# Patient Record
Sex: MALE | Race: BLACK OR AFRICAN AMERICAN | Employment: UNEMPLOYED | URBAN - METROPOLITAN AREA
[De-identification: names, ages, dates, MRNs, and addresses within clinical notes are randomized per-mention and may not be internally consistent; named-entity substitution may affect disease eponyms.]

---

## 2022-04-11 ENCOUNTER — APPOINTMENT (OUTPATIENT)
Dept: NON INVASIVE DIAGNOSTICS | Age: 52
DRG: 917 | End: 2022-04-11
Attending: FAMILY MEDICINE

## 2022-04-11 ENCOUNTER — APPOINTMENT (OUTPATIENT)
Dept: GENERAL RADIOLOGY | Age: 52
DRG: 917 | End: 2022-04-11
Attending: STUDENT IN AN ORGANIZED HEALTH CARE EDUCATION/TRAINING PROGRAM

## 2022-04-11 ENCOUNTER — APPOINTMENT (OUTPATIENT)
Dept: CT IMAGING | Age: 52
DRG: 917 | End: 2022-04-11
Attending: STUDENT IN AN ORGANIZED HEALTH CARE EDUCATION/TRAINING PROGRAM

## 2022-04-11 ENCOUNTER — HOSPITAL ENCOUNTER (INPATIENT)
Age: 52
LOS: 4 days | Discharge: HOME | DRG: 917 | End: 2022-04-15
Attending: STUDENT IN AN ORGANIZED HEALTH CARE EDUCATION/TRAINING PROGRAM | Admitting: FAMILY MEDICINE

## 2022-04-11 DIAGNOSIS — T50.901A ACCIDENTAL OVERDOSE, INITIAL ENCOUNTER: Primary | ICD-10-CM

## 2022-04-11 DIAGNOSIS — F19.10 POLYSUBSTANCE ABUSE (HCC): ICD-10-CM

## 2022-04-11 DIAGNOSIS — J96.02 ACUTE RESPIRATORY FAILURE WITH HYPERCAPNIA (HCC): ICD-10-CM

## 2022-04-11 DIAGNOSIS — R41.89 UNRESPONSIVE: ICD-10-CM

## 2022-04-11 PROBLEM — I21.4 NSTEMI (NON-ST ELEVATED MYOCARDIAL INFARCTION) (HCC): Status: ACTIVE | Noted: 2022-04-11

## 2022-04-11 PROBLEM — R06.89 HYPERCAPNIA: Status: ACTIVE | Noted: 2022-04-11

## 2022-04-11 PROBLEM — J96.01 ACUTE RESPIRATORY FAILURE WITH HYPOXEMIA (HCC): Status: ACTIVE | Noted: 2022-04-11

## 2022-04-11 PROBLEM — F14.90 COCAINE USE: Status: ACTIVE | Noted: 2022-04-11

## 2022-04-11 LAB
AMPHET UR QL SCN: NEGATIVE
ANION GAP SERPL CALC-SCNC: 7 MMOL/L (ref 3–18)
APPEARANCE UR: ABNORMAL
ARTERIAL PATENCY WRIST A: POSITIVE
ARTERIAL PATENCY WRIST A: POSITIVE
BACTERIA URNS QL MICRO: ABNORMAL /HPF
BARBITURATES UR QL SCN: NEGATIVE
BASE DEFICIT BLD-SCNC: 2.9 MMOL/L
BASE DEFICIT BLD-SCNC: 3 MMOL/L
BASE DEFICIT BLD-SCNC: 5.3 MMOL/L
BASE EXCESS BLD CALC-SCNC: 2.3 MMOL/L
BASOPHILS # BLD: 0.1 K/UL (ref 0–0.1)
BASOPHILS NFR BLD: 0 % (ref 0–2)
BDY SITE: ABNORMAL
BENZODIAZ UR QL: NEGATIVE
BILIRUB UR QL: NEGATIVE
BODY TEMPERATURE: 97
BUN SERPL-MCNC: 11 MG/DL (ref 7–18)
BUN/CREAT SERPL: 8 (ref 12–20)
CALCIUM SERPL-MCNC: 9.3 MG/DL (ref 8.5–10.1)
CANNABINOIDS UR QL SCN: POSITIVE
CHLORIDE SERPL-SCNC: 104 MMOL/L (ref 100–111)
CK SERPL-CCNC: 224 U/L (ref 39–308)
CO2 SERPL-SCNC: 28 MMOL/L (ref 21–32)
COCAINE UR QL SCN: POSITIVE
COLOR UR: YELLOW
CREAT SERPL-MCNC: 1.37 MG/DL (ref 0.6–1.3)
DIFFERENTIAL METHOD BLD: ABNORMAL
ECHO AO ROOT DIAM: 2.9 CM
ECHO AO ROOT INDEX: 1.54 CM/M2
ECHO AV AREA PEAK VELOCITY: 2.8 CM2
ECHO AV AREA VTI: 2.9 CM2
ECHO AV AREA/BSA PEAK VELOCITY: 1.5 CM2/M2
ECHO AV AREA/BSA VTI: 1.5 CM2/M2
ECHO AV MEAN GRADIENT: 1 MMHG
ECHO AV MEAN VELOCITY: 0.5 M/S
ECHO AV PEAK GRADIENT: 2 MMHG
ECHO AV PEAK VELOCITY: 0.7 M/S
ECHO AV VELOCITY RATIO: 0.86
ECHO AV VTI: 11.7 CM
ECHO LA VOL 2C: 49 ML (ref 18–58)
ECHO LA VOL 4C: 33 ML (ref 18–58)
ECHO LA VOL BP: 40 ML (ref 18–58)
ECHO LA VOL/BSA BIPLANE: 21 ML/M2 (ref 16–34)
ECHO LA VOLUME AREA LENGTH: 41 ML
ECHO LA VOLUME INDEX A2C: 26 ML/M2 (ref 16–34)
ECHO LA VOLUME INDEX A4C: 18 ML/M2 (ref 16–34)
ECHO LA VOLUME INDEX AREA LENGTH: 22 ML/M2 (ref 16–34)
ECHO LV E' LATERAL VELOCITY: 9 CM/S
ECHO LV E' SEPTAL VELOCITY: 6 CM/S
ECHO LV EDV A2C: 94 ML
ECHO LV EDV A4C: 83 ML
ECHO LV EDV BP: 94 ML (ref 67–155)
ECHO LV EDV INDEX A4C: 44 ML/M2
ECHO LV EDV INDEX BP: 50 ML/M2
ECHO LV EDV NDEX A2C: 50 ML/M2
ECHO LV EJECTION FRACTION A2C: 46 %
ECHO LV EJECTION FRACTION A4C: 42 %
ECHO LV EJECTION FRACTION BIPLANE: 45 % (ref 55–100)
ECHO LV ESV A2C: 51 ML
ECHO LV ESV A4C: 48 ML
ECHO LV ESV BP: 52 ML (ref 22–58)
ECHO LV ESV INDEX A2C: 27 ML/M2
ECHO LV ESV INDEX A4C: 26 ML/M2
ECHO LV ESV INDEX BP: 28 ML/M2
ECHO LV FRACTIONAL SHORTENING: 15 % (ref 28–44)
ECHO LV INTERNAL DIMENSION DIASTOLE INDEX: 1.76 CM/M2
ECHO LV INTERNAL DIMENSION DIASTOLIC: 3.3 CM (ref 4.2–5.9)
ECHO LV INTERNAL DIMENSION SYSTOLIC INDEX: 1.49 CM/M2
ECHO LV INTERNAL DIMENSION SYSTOLIC: 2.8 CM
ECHO LV IVSD: 1.2 CM (ref 0.6–1)
ECHO LV MASS 2D: 109.1 G (ref 88–224)
ECHO LV MASS INDEX 2D: 58 G/M2 (ref 49–115)
ECHO LV POSTERIOR WALL DIASTOLIC: 1 CM (ref 0.6–1)
ECHO LV RELATIVE WALL THICKNESS RATIO: 0.61
ECHO LVOT AREA: 3.1 CM2
ECHO LVOT AV VTI INDEX: 0.97
ECHO LVOT DIAM: 2 CM
ECHO LVOT MEAN GRADIENT: 1 MMHG
ECHO LVOT PEAK GRADIENT: 2 MMHG
ECHO LVOT PEAK VELOCITY: 0.6 M/S
ECHO LVOT STROKE VOLUME INDEX: 18.9 ML/M2
ECHO LVOT SV: 35.5 ML
ECHO LVOT VTI: 11.3 CM
ECHO MV A VELOCITY: 0.35 M/S
ECHO MV E DECELERATION TIME (DT): 312.8 MS
ECHO MV E VELOCITY: 0.36 M/S
ECHO MV E/A RATIO: 1.03
ECHO MV E/E' LATERAL: 4
ECHO MV E/E' RATIO (AVERAGED): 5
ECHO MV E/E' SEPTAL: 6
ECHO MV REGURGITANT PEAK GRADIENT: 0 MMHG
ECHO MV REGURGITANT PEAK VELOCITY: 0.1 M/S
ECHO RV TAPSE: 1.2 CM (ref 1.7–?)
EOSINOPHIL # BLD: 0 K/UL (ref 0–0.4)
EOSINOPHIL NFR BLD: 0 % (ref 0–5)
EPITH CASTS URNS QL MICRO: ABNORMAL /LPF (ref 0–5)
ERYTHROCYTE [DISTWIDTH] IN BLOOD BY AUTOMATED COUNT: 13.1 % (ref 11.6–14.5)
ETHANOL SERPL-MCNC: 15 MG/DL (ref 0–3)
GAS FLOW.O2 O2 DELIVERY SYS: ABNORMAL L/MIN
GAS FLOW.O2 SETTING OXYMISER: 20 BPM
GAS FLOW.O2 SETTING OXYMISER: 20 BPM
GLUCOSE BLD STRIP.AUTO-MCNC: 102 MG/DL (ref 70–110)
GLUCOSE BLD STRIP.AUTO-MCNC: 106 MG/DL (ref 70–110)
GLUCOSE BLD STRIP.AUTO-MCNC: 89 MG/DL (ref 70–110)
GLUCOSE SERPL-MCNC: 80 MG/DL (ref 74–99)
GLUCOSE UR STRIP.AUTO-MCNC: NEGATIVE MG/DL
GRAN CASTS URNS QL MICRO: ABNORMAL /LPF
HCO3 BLD-SCNC: 23.6 MMOL/L (ref 22–26)
HCO3 BLD-SCNC: 24.4 MMOL/L (ref 22–26)
HCO3 BLD-SCNC: 28.1 MMOL/L (ref 22–26)
HCO3 BLD-SCNC: 28.5 MMOL/L (ref 22–26)
HCT VFR BLD AUTO: 45.5 % (ref 36–48)
HDSCOM,HDSCOM: ABNORMAL
HGB BLD-MCNC: 14.4 G/DL (ref 13–16)
HGB UR QL STRIP: ABNORMAL
HYALINE CASTS URNS QL MICRO: ABNORMAL /LPF (ref 0–2)
IMM GRANULOCYTES # BLD AUTO: 0.7 K/UL (ref 0–0.04)
IMM GRANULOCYTES NFR BLD AUTO: 3 % (ref 0–0.5)
KETONES UR QL STRIP.AUTO: NEGATIVE MG/DL
LEUKOCYTE ESTERASE UR QL STRIP.AUTO: NEGATIVE
LYMPHOCYTES # BLD: 1.6 K/UL (ref 0.9–3.6)
LYMPHOCYTES NFR BLD: 6 % (ref 21–52)
MCH RBC QN AUTO: 29.7 PG (ref 24–34)
MCHC RBC AUTO-ENTMCNC: 31.6 G/DL (ref 31–37)
MCV RBC AUTO: 93.8 FL (ref 78–100)
METHADONE UR QL: NEGATIVE
MONOCYTES # BLD: 2.1 K/UL (ref 0.05–1.2)
MONOCYTES NFR BLD: 8 % (ref 3–10)
NEUTS SEG # BLD: 21.4 K/UL (ref 1.8–8)
NEUTS SEG NFR BLD: 83 % (ref 40–73)
NITRITE UR QL STRIP.AUTO: NEGATIVE
NRBC # BLD: 0 K/UL (ref 0–0.01)
NRBC BLD-RTO: 0 PER 100 WBC
O2/TOTAL GAS SETTING VFR VENT: 100 %
O2/TOTAL GAS SETTING VFR VENT: 40 %
O2/TOTAL GAS SETTING VFR VENT: 50 %
O2/TOTAL GAS SETTING VFR VENT: 50 %
OPIATES UR QL: NEGATIVE
OTHER,OTHU: ABNORMAL
PCO2 BLD: 49 MMHG (ref 35–45)
PCO2 BLD: 51.9 MMHG (ref 35–45)
PCO2 BLD: 57.5 MMHG (ref 35–45)
PCO2 BLD: 73.4 MMHG (ref 35–45)
PCP UR QL: NEGATIVE
PEEP RESPIRATORY: 5 CMH2O
PEEP RESPIRATORY: 5 CMH2O
PH BLD: 7.19 [PH] (ref 7.35–7.45)
PH BLD: 7.22 [PH] (ref 7.35–7.45)
PH BLD: 7.28 [PH] (ref 7.35–7.45)
PH BLD: 7.37 [PH] (ref 7.35–7.45)
PH UR STRIP: 5 [PH] (ref 5–8)
PLATELET # BLD AUTO: 446 K/UL (ref 135–420)
PMV BLD AUTO: 9.2 FL (ref 9.2–11.8)
PO2 BLD: 187 MMHG (ref 80–100)
PO2 BLD: 207 MMHG (ref 80–100)
PO2 BLD: 38 MMHG (ref 80–100)
PO2 BLD: 66 MMHG (ref 80–100)
POTASSIUM SERPL-SCNC: 5.1 MMOL/L (ref 3.5–5.5)
PROT UR STRIP-MCNC: 100 MG/DL
RBC # BLD AUTO: 4.85 M/UL (ref 4.35–5.65)
RBC #/AREA URNS HPF: ABNORMAL /HPF (ref 0–5)
SAO2 % BLD: 64 % (ref 92–97)
SAO2 % BLD: 87.9 % (ref 92–97)
SAO2 % BLD: 99.5 % (ref 92–97)
SAO2 % BLD: 99.6 % (ref 92–97)
SERVICE CMNT-IMP: ABNORMAL
SODIUM SERPL-SCNC: 139 MMOL/L (ref 136–145)
SP GR UR REFRACTOMETRY: 1.01 (ref 1–1.03)
SPECIMEN TYPE: ABNORMAL
TROPONIN-HIGH SENSITIVITY: 114 NG/L (ref 0–78)
TROPONIN-HIGH SENSITIVITY: 115 NG/L (ref 0–78)
TROPONIN-HIGH SENSITIVITY: 169 NG/L (ref 0–78)
TROPONIN-HIGH SENSITIVITY: 59 NG/L (ref 0–78)
UROBILINOGEN UR QL STRIP.AUTO: 1 EU/DL (ref 0.2–1)
VENTILATION MODE VENT: ABNORMAL
VENTILATION MODE VENT: ABNORMAL
VT SETTING VENT: 450 ML
VT SETTING VENT: 450 ML
WBC # BLD AUTO: 25.9 K/UL (ref 4.6–13.2)
WBC URNS QL MICRO: ABNORMAL /HPF (ref 0–5)

## 2022-04-11 PROCEDURE — 82962 GLUCOSE BLOOD TEST: CPT

## 2022-04-11 PROCEDURE — 77010033678 HC OXYGEN DAILY

## 2022-04-11 PROCEDURE — 82550 ASSAY OF CK (CPK): CPT

## 2022-04-11 PROCEDURE — 74011000258 HC RX REV CODE- 258: Performed by: INTERNAL MEDICINE

## 2022-04-11 PROCEDURE — 74011000250 HC RX REV CODE- 250: Performed by: INTERNAL MEDICINE

## 2022-04-11 PROCEDURE — 82077 ASSAY SPEC XCP UR&BREATH IA: CPT

## 2022-04-11 PROCEDURE — 96376 TX/PRO/DX INJ SAME DRUG ADON: CPT

## 2022-04-11 PROCEDURE — 99285 EMERGENCY DEPT VISIT HI MDM: CPT

## 2022-04-11 PROCEDURE — 85025 COMPLETE CBC W/AUTO DIFF WBC: CPT

## 2022-04-11 PROCEDURE — 36415 COLL VENOUS BLD VENIPUNCTURE: CPT

## 2022-04-11 PROCEDURE — 65610000006 HC RM INTENSIVE CARE

## 2022-04-11 PROCEDURE — 70450 CT HEAD/BRAIN W/O DYE: CPT

## 2022-04-11 PROCEDURE — 74011250636 HC RX REV CODE- 250/636: Performed by: STUDENT IN AN ORGANIZED HEALTH CARE EDUCATION/TRAINING PROGRAM

## 2022-04-11 PROCEDURE — 71045 X-RAY EXAM CHEST 1 VIEW: CPT

## 2022-04-11 PROCEDURE — 74011000250 HC RX REV CODE- 250: Performed by: STUDENT IN AN ORGANIZED HEALTH CARE EDUCATION/TRAINING PROGRAM

## 2022-04-11 PROCEDURE — 36600 WITHDRAWAL OF ARTERIAL BLOOD: CPT

## 2022-04-11 PROCEDURE — 84484 ASSAY OF TROPONIN QUANT: CPT

## 2022-04-11 PROCEDURE — 82803 BLOOD GASES ANY COMBINATION: CPT

## 2022-04-11 PROCEDURE — 74011000250 HC RX REV CODE- 250: Performed by: FAMILY MEDICINE

## 2022-04-11 PROCEDURE — 74011250636 HC RX REV CODE- 250/636: Performed by: FAMILY MEDICINE

## 2022-04-11 PROCEDURE — 5A1945Z RESPIRATORY VENTILATION, 24-96 CONSECUTIVE HOURS: ICD-10-PCS | Performed by: FAMILY MEDICINE

## 2022-04-11 PROCEDURE — 74011000258 HC RX REV CODE- 258: Performed by: STUDENT IN AN ORGANIZED HEALTH CARE EDUCATION/TRAINING PROGRAM

## 2022-04-11 PROCEDURE — 96374 THER/PROPH/DIAG INJ IV PUSH: CPT

## 2022-04-11 PROCEDURE — 80048 BASIC METABOLIC PNL TOTAL CA: CPT

## 2022-04-11 PROCEDURE — 80307 DRUG TEST PRSMV CHEM ANLYZR: CPT

## 2022-04-11 PROCEDURE — C9113 INJ PANTOPRAZOLE SODIUM, VIA: HCPCS | Performed by: FAMILY MEDICINE

## 2022-04-11 PROCEDURE — 74011250636 HC RX REV CODE- 250/636: Performed by: INTERNAL MEDICINE

## 2022-04-11 PROCEDURE — 93306 TTE W/DOPPLER COMPLETE: CPT

## 2022-04-11 PROCEDURE — 93005 ELECTROCARDIOGRAM TRACING: CPT

## 2022-04-11 PROCEDURE — 94002 VENT MGMT INPAT INIT DAY: CPT

## 2022-04-11 PROCEDURE — 81001 URINALYSIS AUTO W/SCOPE: CPT

## 2022-04-11 RX ORDER — NALOXONE HYDROCHLORIDE 1 MG/ML
1 INJECTION INTRAMUSCULAR; INTRAVENOUS; SUBCUTANEOUS ONCE
Status: COMPLETED | OUTPATIENT
Start: 2022-04-11 | End: 2022-04-11

## 2022-04-11 RX ORDER — NALOXONE HYDROCHLORIDE 1 MG/ML
INJECTION INTRAMUSCULAR; INTRAVENOUS; SUBCUTANEOUS
Status: DISPENSED
Start: 2022-04-11 | End: 2022-04-11

## 2022-04-11 RX ORDER — ONDANSETRON 4 MG/1
4 TABLET, ORALLY DISINTEGRATING ORAL
Status: DISCONTINUED | OUTPATIENT
Start: 2022-04-11 | End: 2022-04-11

## 2022-04-11 RX ORDER — SODIUM CHLORIDE, SODIUM LACTATE, POTASSIUM CHLORIDE, CALCIUM CHLORIDE 600; 310; 30; 20 MG/100ML; MG/100ML; MG/100ML; MG/100ML
75 INJECTION, SOLUTION INTRAVENOUS CONTINUOUS
Status: DISCONTINUED | OUTPATIENT
Start: 2022-04-11 | End: 2022-04-13

## 2022-04-11 RX ORDER — MIDAZOLAM HYDROCHLORIDE 1 MG/ML
INJECTION, SOLUTION INTRAMUSCULAR; INTRAVENOUS
Status: DISPENSED
Start: 2022-04-11 | End: 2022-04-11

## 2022-04-11 RX ORDER — NALOXONE HYDROCHLORIDE 1 MG/ML
2 INJECTION INTRAMUSCULAR; INTRAVENOUS; SUBCUTANEOUS ONCE
Status: COMPLETED | OUTPATIENT
Start: 2022-04-11 | End: 2022-04-11

## 2022-04-11 RX ORDER — ASPIRIN 300 MG/1
300 SUPPOSITORY RECTAL
Status: DISPENSED | OUTPATIENT
Start: 2022-04-11 | End: 2022-04-11

## 2022-04-11 RX ORDER — HEPARIN SODIUM 5000 [USP'U]/ML
5000 INJECTION, SOLUTION INTRAVENOUS; SUBCUTANEOUS EVERY 8 HOURS
Status: DISCONTINUED | OUTPATIENT
Start: 2022-04-11 | End: 2022-04-15 | Stop reason: HOSPADM

## 2022-04-11 RX ORDER — NOREPINEPHRINE BITARTRATE/D5W 8 MG/250ML
.5-16 PLASTIC BAG, INJECTION (ML) INTRAVENOUS
Status: DISCONTINUED | OUTPATIENT
Start: 2022-04-11 | End: 2022-04-12

## 2022-04-11 RX ORDER — CHLORHEXIDINE GLUCONATE 1.2 MG/ML
10 RINSE ORAL EVERY 12 HOURS
Status: DISCONTINUED | OUTPATIENT
Start: 2022-04-11 | End: 2022-04-12

## 2022-04-11 RX ORDER — MAGNESIUM SULFATE HEPTAHYDRATE 40 MG/ML
2 INJECTION, SOLUTION INTRAVENOUS AS NEEDED
Status: DISCONTINUED | OUTPATIENT
Start: 2022-04-11 | End: 2022-04-15 | Stop reason: HOSPADM

## 2022-04-11 RX ORDER — POTASSIUM CHLORIDE 7.45 MG/ML
10 INJECTION INTRAVENOUS AS NEEDED
Status: DISCONTINUED | OUTPATIENT
Start: 2022-04-11 | End: 2022-04-15 | Stop reason: HOSPADM

## 2022-04-11 RX ORDER — MIDAZOLAM IN 0.9 % SOD.CHLORID 1 MG/ML
0-10 PLASTIC BAG, INJECTION (ML) INTRAVENOUS
Status: DISCONTINUED | OUTPATIENT
Start: 2022-04-11 | End: 2022-04-12

## 2022-04-11 RX ORDER — ACETAMINOPHEN 650 MG/1
650 SUPPOSITORY RECTAL
Status: DISCONTINUED | OUTPATIENT
Start: 2022-04-11 | End: 2022-04-15 | Stop reason: HOSPADM

## 2022-04-11 RX ORDER — FENTANYL CITRATE 50 UG/ML
50-100 INJECTION, SOLUTION INTRAMUSCULAR; INTRAVENOUS
Status: DISCONTINUED | OUTPATIENT
Start: 2022-04-11 | End: 2022-04-12

## 2022-04-11 RX ORDER — MIDAZOLAM HYDROCHLORIDE 1 MG/ML
1 INJECTION, SOLUTION INTRAMUSCULAR; INTRAVENOUS
Status: DISCONTINUED | OUTPATIENT
Start: 2022-04-11 | End: 2022-04-12

## 2022-04-11 RX ORDER — SODIUM CHLORIDE 0.9 % (FLUSH) 0.9 %
5-40 SYRINGE (ML) INJECTION EVERY 8 HOURS
Status: DISCONTINUED | OUTPATIENT
Start: 2022-04-11 | End: 2022-04-15 | Stop reason: HOSPADM

## 2022-04-11 RX ORDER — SODIUM CHLORIDE 0.9 % (FLUSH) 0.9 %
5-40 SYRINGE (ML) INJECTION AS NEEDED
Status: DISCONTINUED | OUTPATIENT
Start: 2022-04-11 | End: 2022-04-15 | Stop reason: HOSPADM

## 2022-04-11 RX ORDER — ONDANSETRON 2 MG/ML
4 INJECTION INTRAMUSCULAR; INTRAVENOUS
Status: DISCONTINUED | OUTPATIENT
Start: 2022-04-11 | End: 2022-04-11

## 2022-04-11 RX ORDER — ETOMIDATE 2 MG/ML
INJECTION INTRAVENOUS
Status: COMPLETED | OUTPATIENT
Start: 2022-04-11 | End: 2022-04-11

## 2022-04-11 RX ORDER — ACETAMINOPHEN 325 MG/1
650 TABLET ORAL
Status: DISCONTINUED | OUTPATIENT
Start: 2022-04-11 | End: 2022-04-15 | Stop reason: HOSPADM

## 2022-04-11 RX ORDER — POLYETHYLENE GLYCOL 3350 17 G/17G
17 POWDER, FOR SOLUTION ORAL DAILY PRN
Status: DISCONTINUED | OUTPATIENT
Start: 2022-04-11 | End: 2022-04-15 | Stop reason: HOSPADM

## 2022-04-11 RX ORDER — ROCURONIUM BROMIDE 10 MG/ML
INJECTION, SOLUTION INTRAVENOUS
Status: COMPLETED | OUTPATIENT
Start: 2022-04-11 | End: 2022-04-11

## 2022-04-11 RX ORDER — MIDAZOLAM HYDROCHLORIDE 1 MG/ML
INJECTION, SOLUTION INTRAMUSCULAR; INTRAVENOUS
Status: COMPLETED | OUTPATIENT
Start: 2022-04-11 | End: 2022-04-11

## 2022-04-11 RX ADMIN — SODIUM CHLORIDE 500 ML: 900 INJECTION, SOLUTION INTRAVENOUS at 09:45

## 2022-04-11 RX ADMIN — CHLORHEXIDINE GLUCONATE 10 ML: 1.2 RINSE ORAL at 21:30

## 2022-04-11 RX ADMIN — NALOXONE HYDROCHLORIDE 1 MG: 1 INJECTION PARENTERAL at 00:15

## 2022-04-11 RX ADMIN — SODIUM CHLORIDE, SODIUM LACTATE, POTASSIUM CHLORIDE, AND CALCIUM CHLORIDE 1000 ML: 600; 310; 30; 20 INJECTION, SOLUTION INTRAVENOUS at 00:20

## 2022-04-11 RX ADMIN — SODIUM CHLORIDE 1 MCG/KG/HR: 9 INJECTION, SOLUTION INTRAVENOUS at 20:27

## 2022-04-11 RX ADMIN — FENTANYL CITRATE 150 MCG/HR: 50 INJECTION, SOLUTION INTRAMUSCULAR; INTRAVENOUS at 14:39

## 2022-04-11 RX ADMIN — NOREPINEPHRINE BITARTRATE 4 MCG/MIN: 8 INJECTION, SOLUTION INTRAVENOUS at 12:20

## 2022-04-11 RX ADMIN — SODIUM CHLORIDE 0.4 MCG/KG/HR: 9 INJECTION, SOLUTION INTRAVENOUS at 05:24

## 2022-04-11 RX ADMIN — ROCURONIUM BROMIDE 100 MG: 10 INJECTION INTRAVENOUS at 05:20

## 2022-04-11 RX ADMIN — SODIUM CHLORIDE, POTASSIUM CHLORIDE, SODIUM LACTATE AND CALCIUM CHLORIDE 75 ML/HR: 600; 310; 30; 20 INJECTION, SOLUTION INTRAVENOUS at 05:28

## 2022-04-11 RX ADMIN — SODIUM CHLORIDE 1 MCG/KG/HR: 9 INJECTION, SOLUTION INTRAVENOUS at 14:39

## 2022-04-11 RX ADMIN — MIDAZOLAM 2 MG/HR: 5 INJECTION INTRAMUSCULAR; INTRAVENOUS at 14:39

## 2022-04-11 RX ADMIN — NALOXONE HYDROCHLORIDE 2 MG: 1 INJECTION PARENTERAL at 00:39

## 2022-04-11 RX ADMIN — FENTANYL CITRATE 50 MCG/HR: 50 INJECTION, SOLUTION INTRAMUSCULAR; INTRAVENOUS at 06:15

## 2022-04-11 RX ADMIN — NOREPINEPHRINE BITARTRATE 5 MCG/MIN: 8 INJECTION, SOLUTION INTRAVENOUS at 12:54

## 2022-04-11 RX ADMIN — FENTANYL CITRATE 150 MCG/HR: 50 INJECTION, SOLUTION INTRAMUSCULAR; INTRAVENOUS at 20:39

## 2022-04-11 RX ADMIN — MIDAZOLAM 2 MG: 1 INJECTION INTRAMUSCULAR; INTRAVENOUS at 05:22

## 2022-04-11 RX ADMIN — SODIUM CHLORIDE, PRESERVATIVE FREE 40 MG: 5 INJECTION INTRAVENOUS at 10:51

## 2022-04-11 RX ADMIN — PIPERACILLIN AND TAZOBACTAM 3.38 G: 3; .375 INJECTION, POWDER, LYOPHILIZED, FOR SOLUTION INTRAVENOUS at 21:29

## 2022-04-11 RX ADMIN — SODIUM CHLORIDE, PRESERVATIVE FREE 10 ML: 5 INJECTION INTRAVENOUS at 21:40

## 2022-04-11 RX ADMIN — NOREPINEPHRINE BITARTRATE 2 MCG/MIN: 8 INJECTION, SOLUTION INTRAVENOUS at 20:07

## 2022-04-11 RX ADMIN — PIPERACILLIN AND TAZOBACTAM 3.38 G: 3; .375 INJECTION, POWDER, LYOPHILIZED, FOR SOLUTION INTRAVENOUS; PARENTERAL at 12:00

## 2022-04-11 RX ADMIN — ETOMIDATE 30 MG: 2 INJECTION INTRAVENOUS at 05:20

## 2022-04-11 RX ADMIN — SODIUM CHLORIDE 500 ML: 900 INJECTION, SOLUTION INTRAVENOUS at 10:50

## 2022-04-11 RX ADMIN — HEPARIN SODIUM 5000 UNITS: 5000 INJECTION INTRAVENOUS; SUBCUTANEOUS at 10:51

## 2022-04-11 RX ADMIN — CHLORHEXIDINE GLUCONATE 10 ML: 1.2 RINSE ORAL at 10:51

## 2022-04-11 RX ADMIN — FENTANYL CITRATE 150 MCG/HR: 50 INJECTION, SOLUTION INTRAMUSCULAR; INTRAVENOUS at 10:45

## 2022-04-11 RX ADMIN — NALOXONE HYDROCHLORIDE 1 MG: 1 INJECTION PARENTERAL at 00:07

## 2022-04-11 NOTE — ED PROVIDER NOTES
EMERGENCY DEPARTMENT HISTORY AND PHYSICAL EXAM      Date: 4/11/2022  Patient Name: Danyelle Dumont    History of Presenting Illness     Chief Complaint   Patient presents with    Drug Overdose       HPI:  Danyelle Dumont is a 46 y.o. male who presents with AMS. Limited history provided by: EMS, was at party, white powder noted, received cpr by police department, had pulse on ems arrival.     Gave narcan en route with no change. Due to the degree of altered mental status, the patient cannot reliably provide a history or review of systems. PCP: None        Past History     Past Medical History:  No past medical history on file. Past Surgical History:  No past surgical history on file. Family History:  No family history on file. Social History:  Social History     Tobacco Use    Smoking status: Not on file    Smokeless tobacco: Not on file   Substance Use Topics    Alcohol use: Not on file    Drug use: Not on file       Allergies:  No Known Allergies    PMH, PSH, family history, social history, allergies reviewed with the patient with significant items noted above. Review of Systems   Could not be reliably obtained due to mental status    Physical Exam     Vitals:    04/14/22 2322 04/15/22 0409 04/15/22 0741 04/15/22 1158   BP: 131/79 127/81 (!) 149/99 137/81   Pulse: 73 68 69 80   Resp: 18 18  18   Temp: 99 °F (37.2 °C) 98.2 °F (36.8 °C) 97.9 °F (36.6 °C) 98 °F (36.7 °C)   SpO2: 98% 100% 99% 100%   Weight:       Height:           Gen:ill-appearing, ams  HEENT: Normocephalic, sclera anicteric  Cardiovascular: Normal rate, regular rhythm, no murmurs, rubs, gallops. Pulses intact and equal distally. Pulmonary: minimal respiratory effort, No stridor. Clear lungs. ABD: Soft, nontender, nondistended. Neuro: not Alert. Garbled speech. Altered mentation. PERRLA.   (patient could only attend to a limited neurologic exam)  Psych: Could not be assessed  : No CVA tenderness  EXT: No rashes. Moves all extremities. No cyanosis or clubbing. Skin: Warm and well-perfused. Diagnostic Study Results     Labs -   No results found for this or any previous visit (from the past 12 hour(s)). Radiologic Studies -   XR CHEST PORT   Final Result      Bilateral interstitial opacities, predominantly in the right upper lung. CT HEAD WO CONT   Final Result      Unremarkable noncontrast head CT. XR CHEST PORT   Final Result      No acute cardiopulmonary abnormality. Interval intubation and enteric tube placement. XR CHEST PORT   Final Result      Mild increase in pulmonary interstitial markings, potentially chronic, though   mild interstitial edema not excluded. CT Results  (Last 48 hours)    None        CXR Results  (Last 48 hours)    None            Medical Decision Making   I am the first provider for this patient. I reviewed the vital signs, available nursing notes, past medical history, past surgical history, family history and social history. Vital Signs-Reviewed the patient's vital signs. EKG: Interpreted by myself. EKG non diagnostic, without acute ST elevation, arrhythmia, prolonged QT, or evidence of Brugada       Records Reviewed: Personally, on initial evaluation    MDM:   Patient presents with altered mental status. Exam significant for intermittent movement of extremities, ams persistent despite multiple doses of narcan. DDX considered: The differential is broad but includes toxic, metabolic, infectious, primary neurologic, endocrine, functional etiologies. We will need to perform a broad diagnostic work-up.     Patient condition on initial evaluation: Severely ill    Plan:   Close Observation  Cardiac monitoring    Orders as below:  Orders Placed This Encounter    INTUBATE PATIENT    CT HEAD WO CONT    XR CHEST PORT    XR CHEST PORT    CBC WITH AUTOMATED DIFF    BASIC METABOLIC PANEL    BLOOD GAS, ARTERIAL    TROPONIN-HIGH SENSITIVITY    URINALYSIS W/ RFLX MICROSCOPIC    CK    ETHYL ALCOHOL    Urine Drug Screen    URINE MICROSCOPIC ONLY    TROPONIN-HIGH SENSITIVITY    BLOOD GAS, ARTERIAL    BLOOD GAS, ARTERIAL    ABG    TROPONIN-HIGH SENSITIVITY    TROPONIN-HIGH SENSITIVITY    CALCIUM, IONIZED    CRITICAL CARE (ASAP ONLY)    IP CONSULT TO INTENSIVIST    OT--EVAL, DEVISE PLAN OF CARE AND TREAT    PT--EVAL, DEVISE PLAN OF CARE AND TREAT    IP CONSULT TO RESPIRATORY CARE    OXIMETRY, CONTINUOUS    RT--EXTUBATION OF AIRWAY    BLOOD GAS, ARTERIAL POC    BLOOD GAS, ARTERIAL POC    GLUCOSE, POC    BLOOD GAS, ARTERIAL POC    BLOOD GAS, ARTERIAL POC    GLUCOSE, POC    GLUCOSE, POC    BLOOD GAS, ARTERIAL POC    GLUCOSE, POC    GLUCOSE, POC    GLUCOSE, POC    GLUCOSE, POC    GLUCOSE, POC    EKG, 12 LEAD, INITIAL    TRANSFER PATIENT    DISCHARGE PATIENT    naloxone (NARCAN) injection 1 mg    naloxone (NARCAN) injection 1 mg    lactated ringers bolus infusion 1,000 mL    naloxone (NARCAN) injection 2 mg    naloxone (NARCAN) 1 mg/mL injection    naloxone (NARCAN) 1 mg/mL injection    aspirin (ASA) suppository 300 mg    DISCONTD: fentaNYL (PF) 900 mcg/30 ml infusion soln    DISCONTD: dexmedeTOMidine (PRECEDEX) 400 mcg in 0.9% sodium chloride 100 mL infusion    DISCONTD: lactated Ringers infusion    midazolam (VERSED) 1 mg/mL injection    etomidate (AMIDATE) 2 mg/mL injection    rocuronium injection    midazolam (VERSED) injection    DISCONTD: sodium chloride (NS) flush 5-40 mL    DISCONTD: sodium chloride (NS) flush 5-40 mL    DISCONTD: acetaminophen (TYLENOL) tablet 650 mg    DISCONTD: acetaminophen (TYLENOL) suppository 650 mg    DISCONTD: polyethylene glycol (MIRALAX) packet 17 g    DISCONTD: ondansetron (ZOFRAN ODT) tablet 4 mg    DISCONTD: ondansetron (ZOFRAN) injection 4 mg    DISCONTD: potassium chloride 10 mEq in 100 ml IVPB    DISCONTD: magnesium sulfate 2 g/50 ml IVPB (premix or compounded)    DISCONTD: pantoprazole (PROTONIX) 40 mg in 0.9% sodium chloride 10 mL injection    DISCONTD: midazolam (VERSED) injection 1 mg    DISCONTD: fentaNYL citrate (PF) injection  mcg    DISCONTD: chlorhexidine (PERIDEX) 0.12 % mouthwash 10 mL    DISCONTD: ELECTROLYTE REPLACEMENT PROTOCOL - Potassium Standard Dosing     DISCONTD: ELECTROLYTE REPLACEMENT PROTOCOL - Magnesium     DISCONTD: ELECTROLYTE REPLACEMENT PROTOCOL - Phosphorus  Standard Dosing    DISCONTD: ELECTROLYTE REPLACEMENT PROTOCOL - Calcium     DISCONTD: heparin (porcine) injection 5,000 Units    DISCONTD: piperacillin-tazobactam (ZOSYN) 3.375 g in 0.9% sodium chloride (MBP/ADV) 100 mL MBP    sodium chloride 0.9 % bolus infusion 500 mL    piperacillin-tazobactam (ZOSYN) 3.375 g in 0.9% sodium chloride (MBP/ADV) 100 mL MBP    sodium chloride 0.9 % bolus infusion 500 mL    DISCONTD: NOREPINephrine (LEVOPHED) 8 mg in 5% dextrose 250mL (32 mcg/mL) infusion    DISCONTD: midazolam (VERSED) 100 mg in 0.9% sodium chloride 100 mL infusion    DISCONTD: midazolam in normal saline (VERSED) 1 mg/mL infusion    glucagon (GLUCAGEN) 1 mg injection    calcium gluconate 2 g/100 mL sodium chloride (ISO-OSM)    DISCONTD: amLODIPine (NORVASC) tablet 5 mg    regadenoson (LEXISCAN) 0.4 mg/5 mL injection    regadenoson (LEXISCAN) injection 0.4 mg    technetium sestamibi (CARDIOLITE) injection 00.3 millicurie    technetium sestamibi TC 99M (CARDIOLITE) injection 84.1 millicurie    DISCONTD: piperacillin-tazobactam (ZOSYN) 3.375 g in 0.9% sodium chloride (MBP/ADV) 100 mL MBP    DISCONTD: guaiFENesin (ROBITUSSIN) 100 mg/5 mL oral liquid 200 mg    amLODIPine (NORVASC) 5 mg tablet    IP CONSULT TO CARDIOLOGY    INITIAL PHYSICIAN ORDER: INPATIENT Intensive Care; Yes; 4.  Patient requires ICU level of care interventions (further clarification in H&P documentation)    IP CONSULT TO NUTRITION SERVICES        Discussed with intensivist, worsening abg, will need intubation. ED Course:   ED Course as of 04/19/22 0100   Mon Apr 11, 2022   0444 Received another 2 mg of narcan. Hyper carbia [DM]   3650 Has received 5mg total here. [DM]   3759 4:10 AM Consult: I discussed care with Dr. Oskar Schafer (ICU). It was a standard discussion including patient history, chief complaint, available diagnostic results, and predicted treatment course. [DM]   5102 Will need ICU, Dr. Paul Necessary [DM]   0856 Multiple attempts to transport to CT [DM]   4970 5:08 AM  Will plan to admit for nstemi. The patient understands and agrees with the plan. Spoke with Dr. Beverly Cramer the on call admitting clinician who agrees to admit patient. Appreciate the assistance in the care of this patient.       [DM]      ED Course User Index  [DM] Jet Yee MD          Critical Care  Performed by: Jet Yee MD  Authorized by: Jet Yee MD     Critical care provider statement:     Critical care time (minutes):  110    Critical care was necessary to treat or prevent imminent or life-threatening deterioration of the following conditions:  Respiratory failure, CNS failure or compromise, toxidrome and cardiac failure    Critical care was time spent personally by me on the following activities:  Discussions with consultants, discussions with primary provider, evaluation of patient's response to treatment, re-evaluation of patient's condition, pulse oximetry, ordering and review of radiographic studies, ordering and review of laboratory studies, ordering and performing treatments and interventions and development of treatment plan with patient or surrogate  Intubation    Date/Time: 4/11/2022 5:32 AM  Performed by: Jet Yee MD  Authorized by: Jet Yee MD     Consent:     Consent obtained:  Emergent situation  Pre-procedure details:     Patient status:  Unresponsive    Mallampati score:  IV    Pretreatment medications:  None    Paralytics:  Rocuronium  Procedure details: Laryngoscope blade: Mac 4    Tube size (mm):  8.0    Tube type:  Cuffed    Number of attempts:  1    Tube visualized through cords: yes    Placement assessment:     ETT to lip:  24    Tube secured with:  ETT marrufo    Breath sounds:  Equal    Placement verification: ETCO2 detector      CXR findings:  ETT in proper place  Post-procedure details:     Patient tolerance of procedure:   Tolerated well, no immediate complications          Vitals Review/addressed -     Diagnostic Study Results     Orders Placed This Encounter    INTUBATE PATIENT     This order was created via procedure documentation     Standing Status:   Standing     Number of Occurrences:   1    CT HEAD WO CONT     Standing Status:   Standing     Number of Occurrences:   1     Order Specific Question:   Transport     Answer:   BED [2]     Order Specific Question:   Reason for Exam     Answer:   ams     Order Specific Question:   Decision Support Exception     Answer:   Emergency Medical Condition (MA) [1]    XR CHEST PORT     Standing Status:   Standing     Number of Occurrences:   1     Order Specific Question:   Reason for Exam     Answer:   ams    XR CHEST PORT     Standing Status:   Standing     Number of Occurrences:   1     Order Specific Question:   Reason for Exam     Answer:   intubation    CBC WITH AUTOMATED DIFF     Standing Status:   Standing     Number of Occurrences:   1    BASIC METABOLIC PANEL     Standing Status:   Standing     Number of Occurrences:   1    BLOOD GAS, ARTERIAL     Standing Status:   Standing     Number of Occurrences:   1    TROPONIN-HIGH SENSITIVITY     Standing Status:   Standing     Number of Occurrences:   1    URINALYSIS W/ RFLX MICROSCOPIC     Standing Status:   Standing     Number of Occurrences:   1    CK     Standing Status:   Standing     Number of Occurrences:   1    ETHYL ALCOHOL     Standing Status:   Standing     Number of Occurrences:   1    Urine Drug Screen     Standing Status:   Standing Number of Occurrences:   1    URINE MICROSCOPIC ONLY     Standing Status:   Standing     Number of Occurrences:   1    TROPONIN-HIGH SENSITIVITY     Standing Status:   Standing     Number of Occurrences:   1    BLOOD GAS, ARTERIAL     Standing Status:   Standing     Number of Occurrences:   1    BLOOD GAS, ARTERIAL     Standing Status:   Standing     Number of Occurrences:   1    ABG     30-60 Minutes after intubation. Standing Status:   Standing     Number of Occurrences:   1     Order Specific Question:   FIO2/Device     Answer: Other-please specify    TROPONIN-HIGH SENSITIVITY     Standing Status:   Standing     Number of Occurrences:   1    TROPONIN-HIGH SENSITIVITY     Standing Status:   Standing     Number of Occurrences:   1    CALCIUM, IONIZED     Standing Status:   Standing     Number of Occurrences:   1    CRITICAL CARE (ASAP ONLY)     This order was created via procedure documentation     Standing Status:   Standing     Number of Occurrences:   1    IP CONSULT TO INTENSIVIST     Standing Status:   Standing     Number of Occurrences:   1     Order Specific Question:   Reason for Consult: Answer:   critical care     Order Specific Question:   Did you call or speak to the consulting provider? Answer:   No     Order Specific Question:   Consult To     Answer:   intensivist     Order Specific Question:   Schedule When?      Answer:   TODAY    OT--EVAL, DEVISE PLAN OF CARE AND TREAT     Standing Status:   Standing     Number of Occurrences:   1    PT--EVAL, DEVISE PLAN OF CARE AND TREAT     Standing Status:   Standing     Number of Occurrences:   1    IP CONSULT TO RESPIRATORY CARE     Standing Status:   Standing     Number of Occurrences:   1    OXIMETRY, CONTINUOUS     Standing Status:   Standing     Number of Occurrences:   1    RT--EXTUBATION OF AIRWAY     Standing Status:   Standing     Number of Occurrences:   1    BLOOD GAS, ARTERIAL POC     Standing Status:   Standing Number of Occurrences:   1    BLOOD GAS, ARTERIAL POC     Standing Status:   Standing     Number of Occurrences:   1    GLUCOSE, POC     Standing Status:   Standing     Number of Occurrences:   1    BLOOD GAS, ARTERIAL POC     Standing Status:   Standing     Number of Occurrences:   1    BLOOD GAS, ARTERIAL POC     Standing Status:   Standing     Number of Occurrences:   1    GLUCOSE, POC     Standing Status:   Standing     Number of Occurrences:   1    GLUCOSE, POC     Standing Status:   Standing     Number of Occurrences:   1    BLOOD GAS, ARTERIAL POC     Standing Status:   Standing     Number of Occurrences:   1    GLUCOSE, POC     Standing Status:   Standing     Number of Occurrences:   1    GLUCOSE, POC     Standing Status:   Standing     Number of Occurrences:   1    GLUCOSE, POC     Standing Status:   Standing     Number of Occurrences:   1    GLUCOSE, POC     Standing Status:   Standing     Number of Occurrences:   1    GLUCOSE, POC     Standing Status:   Standing     Number of Occurrences:   1    EKG, 12 LEAD, INITIAL     Standing Status:   Standing     Number of Occurrences:   1     Order Specific Question:   Reason for Exam:     Answer:   ams    TRANSFER PATIENT     Standing Status:   Standing     Number of Occurrences:   1     Order Specific Question:   Type of Bed     Answer:   Telemetry [19]     Order Specific Question:   Cardiac Monitoring Required? Answer:    Yes    DISCHARGE PATIENT     Standing Status:   Standing     Number of Occurrences:   1    naloxone (NARCAN) injection 1 mg    naloxone (NARCAN) injection 1 mg    lactated ringers bolus infusion 1,000 mL    naloxone (NARCAN) injection 2 mg    naloxone (NARCAN) 1 mg/mL injection     Sascha Spaulding: cabinet override    naloxone (NARCAN) 1 mg/mL injection     Nicole Trejo: cabinet override    aspirin (ASA) suppository 300 mg    DISCONTD: fentaNYL (PF) 900 mcg/30 ml infusion soln     Order Specific Question:   Titrate Infusion     Answer:   Yes     Order Specific Question:   Initial Infusion Rate: Answer:   48 mcg/hr     Order Specific Question:   Titrate Every 15 Minutes In Increments of: Answer:   22 mcg/hr     Order Specific Question:   Goal of Therapy is: Answer:   RASS of 0 to -1     Order Specific Question:   Contact physician for: Answer:   SBP less than 90 mmHg     Order Specific Question:   Contact physician for: Answer:   Patient not achieving goal and is at max rate    DISCONTD: dexmedeTOMidine (PRECEDEX) 400 mcg in 0.9% sodium chloride 100 mL infusion     Order Specific Question:   Titrate Infusion? Answer:   Yes     Order Specific Question:   Initial Infusion Rate: Answer:   0.4 mcg/kg/hr     Order Specific Question:   Titrate Every 15 Minutes to Achieve Goal of Therapy by: Answer:   0.1 mcg/kg/hr     Order Specific Question:   Goal of Therapy:     Answer:   RASS 0 to -1     Order Specific Question:   Contact Physician for: Answer:   SBP less than 90 mmHg     Order Specific Question:   Contact Physician for: Answer:   HR less than 50 bpm     Order Specific Question:   Contact Physician for:      Answer:   Patient not achieving goal and is at max rate    DISCONTD: lactated Ringers infusion    midazolam (VERSED) 1 mg/mL injection     Lucita Dredge: cabinet override    etomidate (AMIDATE) 2 mg/mL injection    rocuronium injection    midazolam (VERSED) injection    DISCONTD: sodium chloride (NS) flush 5-40 mL    DISCONTD: sodium chloride (NS) flush 5-40 mL    DISCONTD: acetaminophen (TYLENOL) tablet 650 mg    DISCONTD: acetaminophen (TYLENOL) suppository 650 mg    DISCONTD: polyethylene glycol (MIRALAX) packet 17 g    DISCONTD: ondansetron (ZOFRAN ODT) tablet 4 mg    DISCONTD: ondansetron (ZOFRAN) injection 4 mg    DISCONTD: potassium chloride 10 mEq in 100 ml IVPB    DISCONTD: magnesium sulfate 2 g/50 ml IVPB (premix or compounded)    DISCONTD: pantoprazole (PROTONIX) 40 mg in 0.9% sodium chloride 10 mL injection     Order Specific Question:   PPI INDICATION     Answer:   SUP Prophylaxis    DISCONTD: midazolam (VERSED) injection 1 mg    DISCONTD: fentaNYL citrate (PF) injection  mcg    DISCONTD: chlorhexidine (PERIDEX) 0.12 % mouthwash 10 mL    DISCONTD: ELECTROLYTE REPLACEMENT PROTOCOL - Potassium Standard Dosing     DISCONTD: ELECTROLYTE REPLACEMENT PROTOCOL - Magnesium     DISCONTD: ELECTROLYTE REPLACEMENT PROTOCOL - Phosphorus  Standard Dosing    DISCONTD: ELECTROLYTE REPLACEMENT PROTOCOL - Calcium     DISCONTD: heparin (porcine) injection 5,000 Units    DISCONTD: piperacillin-tazobactam (ZOSYN) 3.375 g in 0.9% sodium chloride (MBP/ADV) 100 mL MBP     Order Specific Question:   Antibiotic Indications     Answer:   Aspiration Pneumonia     Order Specific Question:   Antibiotic Indications     Answer:   Urinary Tract Infection     Order Specific Question:   UTI duration of therapy     Answer:   7 days    sodium chloride 0.9 % bolus infusion 500 mL    piperacillin-tazobactam (ZOSYN) 3.375 g in 0.9% sodium chloride (MBP/ADV) 100 mL MBP     Order Specific Question:   Antibiotic Indications     Answer:   Urinary Tract Infection     Order Specific Question:   UTI duration of therapy     Answer:   7 days    sodium chloride 0.9 % bolus infusion 500 mL    DISCONTD: NOREPINephrine (LEVOPHED) 8 mg in 5% dextrose 250mL (32 mcg/mL) infusion     Order Specific Question:   Titrate Infusion? Answer:   Yes     Order Specific Question:   Initial Infusion Rate: Answer:   4 mcg/min     Order Specific Question:   Titrate Every 5 Minutes In Increments of: Answer:   1 mcg/min     Order Specific Question:   Goal of Therapy is: Answer: Other     Order Specific Question:   Other:     Answer:   SBP > 100 mm Hg     Order Specific Question:   Contact Physician for:      Answer:   Patient is not achieving goal and is at max rate    DISCONTD: midazolam (VERSED) 100 mg in 0.9% sodium chloride 100 mL infusion     Order Specific Question:   Titrate Infusion? Answer:   Yes     Order Specific Question:   Initial Infusion Rate: Answer:   2 mg/hr     Order Specific Question:   Titrate Every 10 Minutes in Increments of: Answer:   1 mg/hr     Order Specific Question:   Goal of Therapy is: Answer:   RASS 0 TO -1     Order Specific Question:   Contact Physician for: Answer:   SBP less than 90 mmHg     Order Specific Question:   Contact Physician for: Answer:   Patient not achieving goal and is at max rate    DISCONTD: midazolam in normal saline (VERSED) 1 mg/mL infusion     Order Specific Question:   Titrate Infusion? Answer:   Yes     Order Specific Question:   Initial Infusion Rate: Answer:   2 mg/hr     Order Specific Question:   Titrate Every 10 Minutes in Increments of: Answer:   1 mg/hr     Order Specific Question:   Goal of Therapy is: Answer:   RASS 0 TO -1     Order Specific Question:   Contact Physician for: Answer:   SBP less than 90 mmHg     Order Specific Question:   Contact Physician for:      Answer:   Patient not achieving goal and is at max rate    glucagon (GLUCAGEN) 1 mg injection     Magno Marsh: cabinet override    calcium gluconate 2 g/100 mL sodium chloride (ISO-OSM)    DISCONTD: amLODIPine (NORVASC) tablet 5 mg    regadenoson (LEXISCAN) 0.4 mg/5 mL injection     Eva Coronel: cabinet override    regadenoson (LEXISCAN) injection 0.4 mg    technetium sestamibi (CARDIOLITE) injection 81.9 millicurie    technetium sestamibi TC 99M (CARDIOLITE) injection 70.6 millicurie    DISCONTD: piperacillin-tazobactam (ZOSYN) 3.375 g in 0.9% sodium chloride (MBP/ADV) 100 mL MBP     Order Specific Question:   Antibiotic Indications     Answer:   Aspiration Pneumonia     Order Specific Question:   Antibiotic Indications     Answer:   Urinary Tract Infection     Order Specific Question:   UTI duration of therapy     Answer:   7 days    DISCONTD: guaiFENesin (ROBITUSSIN) 100 mg/5 mL oral liquid 200 mg    amLODIPine (NORVASC) 5 mg tablet     Sig: Take 1 Tablet by mouth daily. Dispense:  30 Tablet     Refill:  0    IP CONSULT TO CARDIOLOGY     Standing Status:   Standing     Number of Occurrences:   1     Order Specific Question:   Reason for Consult: Answer:   elevated troponin     Order Specific Question:   Did you call or speak to the consulting provider? Answer: Yes    INITIAL PHYSICIAN ORDER: INPATIENT Intensive Care; Yes; 4. Patient requires ICU level of care interventions (further clarification in H&P documentation)     Standing Status:   Standing     Number of Occurrences:   1     Order Specific Question:   Status: Answer:   INPATIENT [101]     Order Specific Question:   Type of Bed     Answer:   Intensive Care [6]     Order Specific Question:   Cardiac Monitoring Required? Answer:   Yes     Order Specific Question:   Inpatient Hospitalization Certified Necessary for the Following Reasons     Answer:   4. Patient requires ICU level of care interventions (further clarification in H&P documentation)     Order Specific Question:   Admitting Diagnosis     Answer:   NSTEMI (non-ST elevated myocardial infarction) [facility] [[id number]]     Order Specific Question:   Admitting Physician     Answer:   Sussy Gillette [[id number]]     Order Specific Question:   Attending Physician     Answer:   Sussy Gillette [[id number]]     Order Specific Question:   Estimated Length of Stay     Answer:   3-4 Midnights     Order Specific Question:   Discharge Plan:     Answer:   Home with Office Follow-up    IP CONSULT TO NUTRITION SERVICES     Standing Status:   Standing     Number of Occurrences:   1     Order Specific Question:   Reason for Consult:      Answer:   Tube Feedings Order and Management (Not available in [place])       Labs -   No results found for this or any previous visit (from the past 12 hour(s)). Radiologic Studies -   XR CHEST PORT   Final Result      Bilateral interstitial opacities, predominantly in the right upper lung. CT HEAD WO CONT   Final Result      Unremarkable noncontrast head CT. XR CHEST PORT   Final Result      No acute cardiopulmonary abnormality. Interval intubation and enteric tube placement. XR CHEST PORT   Final Result      Mild increase in pulmonary interstitial markings, potentially chronic, though   mild interstitial edema not excluded. CT Results  (Last 48 hours)    None        CXR Results  (Last 48 hours)    None          Disposition     Disposition:  Admit    CLINICAL IMPRESSION:    1. Accidental overdose, initial encounter    2. Polysubstance abuse ([address]    3. Unresponsive    4. Acute respiratory failure with hypercapnia ([address]        It should be noted that I will be the provider of record for this patient  Emiliano Mercado MD    Follow-up Information     Follow up With Specialties Details Why Contact Info    [facility]   As needed [address] [phone]    CMS is unable to reach pt via phone. The pt's available phone number is for a correctional center. Discharge Medication List as of 4/15/2022 12:29 PM          Please note that this dictation was completed with [employer], the computer voice recognition software. Quite often unanticipated grammatical, syntax, homophones, and other interpretive errors are inadvertently transcribed by the computer software. Please disregard these errors. Please excuse any errors that have escaped final proofreading.

## 2022-04-11 NOTE — ED TRIAGE NOTES
Pt presents to ED via EMS  With OD;  Per EMS pt found with snoring respiration and unresponsive;  Bystander CPR was being performed when EMS arrived pt had strong pulse and stopped CPR;  Pt assisted with breathing via BVM; EMS gave narcan 2mg IN without much change in status; IV established and more Narcan given unsure of amount received due to aggressive behavior;  Upon arrive al pt placed on NRB at 10l;  Dr. Celestine Mcmahon at bedside

## 2022-04-11 NOTE — ROUTINE PROCESS
[id number] Transported Patient to CT and to ICU. Transport went well. Patient airway is patent and secure. No respiratory distress is noted at this time.

## 2022-04-11 NOTE — PROGRESS NOTES
[facility]ha Lange Zosyn (Piperacillin/Tazobactam) Extended Infusion    Danyelle Dumont, a 46 y.o. yo male, has been converted to an extended infusion of Zosyn while in the intensive care unit. A loading dose of 3.375 will be given over 30 minutes depending on indication. Extended infusions will begin 4 hours after the initial dose if CrCl  >/= 20 ml/min or 8 hours after the initial dose if CrCl < 20 ml/min. Extended infusions will run over 4 hours (240 minutes).     Recent Labs     04/11/22  0020   CREA 1.37*     Ht Readings from Last 1 Encounters:   04/11/22 182.9 cm (72\")     Wt Readings from Last 1 Encounters:   04/11/22 67.6 kg (149 lb)       CrCl : Serum creatinine: 1.37 mg/dL (H) 04/11/22 0020  Estimated creatinine clearance: 61 mL/min (A)    Renal adjustment of extended infusion of Zosyn  3.375 gm every 8 hours for CrCl >/= 20 ml/min

## 2022-04-11 NOTE — CONSULTS
[facility]  Pulmonary, Critical Care, and Sleep Medicine    Name: Karen Iqbal MRN: [id number]   : 1970 Hospital: [facility] [place]    Date: 2022  Room: [facility]/[address] [place] [address] Note                                              Consult requesting physician: Dr. Richard Rothman  Reason for Consult: Acute hypercapnic respiratory failure    IMPRESSION:   · Acute hypercapnic hypoxic respiratory failure - J96.01, J96.02  · Leukocytosis - D72.829  · Encephalopathy - likely 2' to below - G93.40  · Elevated troponin - R77.8  · QT prolongation - R94.31  · Drug OD - T50.901A  Patient Active Problem List   Diagnosis Code    NSTEMI (non-ST elevated myocardial infarction) ([facility] [address] I21.4    Acute respiratory failure with hypoxemia (HCC) J96.01    Hypercapnia R06.89    Drug overdose T50.901A    Cocaine use F14.90   · Code status: Full Code     RECOMMENDATIONS:     Respiratory: Mech. Ventilated patients- aim to keep peak plateau pressure less than or equal to 30cm H2O. Titrate FiO2 for goal SPO2> 91%  CXR and ABG reviewed  Repeat ABG post intubation  Daily ABG and CXR while intubated  VAP prevention bundle and sedation bundle followed, head of the bed at 30' all times  Daily sedation holiday and assessment for weaning with SBT as tolerated  Sputum culture per ET tube  Bronchodilators as needed, pulmonary hygiene care    CVS: monitor hemodynamics- stable  Received  mg rectal x 1 per staff  Serial Troponin  Await ECHO and cardiology input  EKG in AM to monitor QTc  Avoid medication known to prolong QTc if possible  IVF: NS 75 mL/hr    ID: leukocytosis of unclear source  Sepsis bundle and protocol followed  Follow sputum culture  Antibiotics: zosyn   Deescalate antibiotic when appropriate. Hematology/Oncology: Hgb is expected to drop with IV fluid  Monitor serial CBC    Renal: Monitor UO, renal function and lytes    GI/: N.p.o.  PPI for GI prophylaxis    Endocrine: Monitor BS.  SSI as needed    Neurology: Sedated: Fentanyl, Precedex; as needed Versed and fentanyl  CT head nil acute on admission    Psychiatry: Chronic history of polysubstance abuse per son    Toxicology: Abnormal UDS    Pain/Sedation: As above    Skin/Wound: As per nursing protocol    Electrolytes: Replace electrolytes per ICU electrolyte replacement protocol. IVF: NS 75 mL an hour    Nutrition: N.p.o.    Prophylaxis: DVT Prophylaxis: SCD/heparin. GI Prophylaxis: PPI. Restraints: Wrist soft restraints for patient interfering with medical therapy/management and patient safety. Lines/Tubes: PIV  ETT: 4/11/2022. OGT: 4/11/2022. Roque: 4/11/2022 (Medically necessary for strict input/output monitoring in critically ill patient, will remove it when not needed. Roque bundle followed). PT/OT eval and treat, OOB -when more stable     Advance Directive/Palliative Care: Consult as per clinical course. Will defer respective systems problem management to primary and other respective consultant and follow patient in ICU with primary and other medical team.  Further recommendations will be based on the patient's response to recommended treatment and results of the investigation ordered. Quality Care: PPI, DVT prophylaxis, HOB elevated, Infection control all reviewed and addressed. Care of plan d/w RN, RT, MDR, hospitalist team.  D/w patient's family-son (answered all questions to satisfaction). He is planning to come over in the next 24 to 48 hours to be seen patient at bedside    High complexity decision making was performed during the evaluation of this patient at high risk for decompensation with multiple organ involvement. Total critical care time spent rendering care exclusive of procedures/family discussion/coordination of care: 55 minutes.       Subjective/History of Present Illness:   Patient is a 46 y.o. male with PMHx longstanding drug and alcohol abuse presented to [facility] ED via EMS after found unresponsive with snoring respirations. Patient remains on ventilator and unable to provide additional information. As per chart, bystander CPR by police was performed when EMS arrived with patient found to have a strong pulse and stopped CPR. He did not respond to Narcan in the field. Patient was transferred to ER with bag-valve-mask breathing. He did not respond to Narcan in the ER with brief period of awakening and lethargic. ABG initially showed hypercapnia. Follow-up ABG showed worsening hypercapnia and hypoxia requiring to intubate the patient and put on ventilator for airway protection. Labs in ER showed leukocytosis, mildly elevated creatinine, elevated troponin, abnormal UDS with cocaine and THC positive while high alcohol levels in blood. Chest x-ray no significant abnormality. CT head without any acute abnormality in ER. Cardiology consulted and recommended monitoring troponin and checking echo, no heparin IV drip. Spoke with son. He lives in [place] and patient is estranged from son. Last they spoke/met during [place] when he was visiting [place]. Information is significantly limited               I/O last 24 hrs: Intake/Output Summary (Last 24 hours) at 4/11/2022 0746  Last data filed at 4/11/2022 0542  Gross per 24 hour   Intake --   Output 1200 ml   Net -1200 ml         The patient is critically ill and can not provide additional history due to Ventilated    History taken from EMR    Review of Systems:  ROS not obtained due to patient factor. No Known Allergies     No past medical history on file. No past surgical history on file. Social History     Tobacco Use    Smoking status: Not on file    Smokeless tobacco: Not on file   Substance Use Topics    Alcohol use: Not on file      No family history on file.      Prior to Admission medications    Not on File     Current Facility-Administered Medications   Medication Dose Route Frequency    naloxone (NARCAN) 1 mg/mL injection  aspirin (ASA) suppository 300 mg  300 mg Rectal NOW    fentaNYL (PF) 900 mcg/30 ml infusion soln  0-200 mcg/hr IntraVENous TITRATE    dexmedeTOMidine (PRECEDEX) 400 mcg in 0.9% sodium chloride 100 mL infusion  0.1-1.5 mcg/kg/hr IntraVENous TITRATE    lactated Ringers infusion  75 mL/hr IntraVENous CONTINUOUS    sodium chloride (NS) flush 5-40 mL  5-40 mL IntraVENous Q8H    pantoprazole (PROTONIX) 40 mg in 0.9% sodium chloride 10 mL injection  40 mg IntraVENous DAILY         Objective:   Vital Signs:    Visit Vitals  BP 99/69   Pulse 80   Temp 98.2 °F (36.8 °C)   Resp 20   Wt 66.2 kg (145 lb 15.1 oz)   SpO2 98%       O2 Device: Endotracheal tube,Ventilator      Temp (24hrs), Av °F (36.7 °C), Min:97.7 °F (36.5 °C), Max:98.2 °F (36.8 °C)       Intake/Output:   Last shift:      No intake/output data recorded. Last 3 shifts:  1901 -  0700  In: -   Out: 1200 [Urine:1200]      Intake/Output Summary (Last 24 hours) at 2022 0746  Last data filed at 2022 0542  Gross per 24 hour   Intake --   Output 1200 ml   Net -1200 ml       Last 3 Recorded Weights in this Encounter    22 0022 22 7080   Weight: 67.7 kg (149 lb 4 oz) 66.2 kg (145 lb 15.1 oz)         Ventilator Settings:  Mode Rate Tidal Volume Pressure FiO2 PEEP   Assist control   450 ml    50 % 5 cm H20     Peak airway pressure:      Plateau pressure:     Tidal volume:    Minute ventilation:       Recent Labs     22  0349 22  0038   PHI 7.19* 7.22*   PCO2I 73.4* 57.5*   PO2I 66* 207*   HCO3I 28.1* 23.6   FIO2I 40 100       Physical Exam:     General/Neurology: sedated, moves spontaneously all extremities, intermittently agitated and sitting up in bed, no involuntary movements. On ventilator  Head:   Normocephalic, without obvious abnormality, atraumatic. Eye:   No scleral icterus, no pallor, no cyanosis. Nose:   No sinus tenderness  Throat:  Visible lips, mucosa, and tongue normal. No oral thrush.   Orally intubated  Neck:   Supple, symmetric. No lymphadenopathy. Trachea midline  Lung: Moderate air entry bilateral equal. No rales. No rhonchi. No wheezing. No stridors. No prolongded expiration. No accessory muscle use. Heart:   Regular rate & rhythm. S1 S2 present. No murmur. No JVD. Abdomen:  Soft. NT. ND. +BS. No masses. Extremities:  No pedal edema. No cyanosis. No clubbing. Pulses: 2+ and symmetric in DP. Capillary refill: normal  Lymphatic:  No cervical or supraclavicular palpable lymphadenopathy. Musculoskeletal: No joint swelling. No tenderness. Skin:   Color, texture, turgor normal. No rashes or lesions. Data:       Recent Results (from the past 24 hour(s))   CBC WITH AUTOMATED DIFF    Collection Time: 04/11/22 12:20 AM   Result Value Ref Range    WBC 25.9 (H) 4.6 - 13.2 K/uL    RBC 4.85 4.35 - 5.65 M/uL    HGB 14.4 13.0 - 16.0 g/dL    HCT 45.5 36.0 - 48.0 %    MCV 93.8 78.0 - 100.0 FL    MCH 29.7 24.0 - 34.0 PG    MCHC 31.6 31.0 - 37.0 g/dL    RDW 13.1 11.6 - 14.5 %    PLATELET 626 (H) 925 - 420 K/uL    MPV 9.2 9.2 - 11.8 FL    NRBC 0.0 0  WBC    ABSOLUTE NRBC 0.00 0.00 - 0.01 K/uL    NEUTROPHILS 83 (H) 40 - 73 %    LYMPHOCYTES 6 (L) 21 - 52 %    MONOCYTES 8 3 - 10 %    EOSINOPHILS 0 0 - 5 %    BASOPHILS 0 0 - 2 %    IMMATURE GRANULOCYTES 3 (H) 0.0 - 0.5 %    ABS. NEUTROPHILS 21.4 (H) 1.8 - 8.0 K/UL    ABS. LYMPHOCYTES 1.6 0.9 - 3.6 K/UL    ABS. MONOCYTES 2.1 (H) 0.05 - 1.2 K/UL    ABS. EOSINOPHILS 0.0 0.0 - 0.4 K/UL    ABS. BASOPHILS 0.1 0.0 - 0.1 K/UL    ABS. IMM.  GRANS. 0.7 (H) 0.00 - 0.04 K/UL    DF AUTOMATED     METABOLIC PANEL, BASIC    Collection Time: 04/11/22 12:20 AM   Result Value Ref Range    Sodium 139 136 - 145 mmol/L    Potassium 5.1 3.5 - 5.5 mmol/L    Chloride 104 100 - 111 mmol/L    CO2 28 21 - 32 mmol/L    Anion gap 7 3.0 - 18 mmol/L    Glucose 80 74 - 99 mg/dL    BUN 11 7.0 - 18 MG/DL    Creatinine 1.37 (H) 0.6 - 1.3 MG/DL    BUN/Creatinine ratio 8 (L) 12 - 20 GFR est AA >60 >60 ml/min/1.73m2    GFR est non-AA 55 (L) >60 ml/min/1.73m2    Calcium 9.3 8.5 - 10.1 MG/DL   TROPONIN-HIGH SENSITIVITY    Collection Time: 04/11/22 12:20 AM   Result Value Ref Range    Troponin-High Sensitivity 114 (HH) 0 - 78 ng/L   URINALYSIS W/ RFLX MICROSCOPIC    Collection Time: 04/11/22 12:20 AM   Result Value Ref Range    Color YELLOW      Appearance CLOUDY      Specific gravity 1.013 1.005 - 1.030      pH (UA) 5.0 5.0 - 8.0      Protein 100 (A) NEG mg/dL    Glucose Negative NEG mg/dL    Ketone Negative NEG mg/dL    Bilirubin Negative NEG      Blood SMALL (A) NEG      Urobilinogen 1.0 0.2 - 1.0 EU/dL    Nitrites Negative NEG      Leukocyte Esterase Negative NEG     CK    Collection Time: 04/11/22 12:20 AM   Result Value Ref Range     39 - 308 U/L   ETHYL ALCOHOL    Collection Time: 04/11/22 12:20 AM   Result Value Ref Range    ALCOHOL(ETHYL),SERUM 15 (H) 0 - 3 MG/DL   DRUG SCREEN, URINE    Collection Time: 04/11/22 12:20 AM   Result Value Ref Range    BENZODIAZEPINES Negative NEG      BARBITURATES Negative NEG      THC (TH-CANNABINOL) Positive (A) NEG      OPIATES Negative NEG      PCP(PHENCYCLIDINE) Negative NEG      COCAINE Positive (A) NEG      AMPHETAMINES Negative NEG      METHADONE Negative NEG      HDSCOM (NOTE)    URINE MICROSCOPIC ONLY    Collection Time: 04/11/22 12:20 AM   Result Value Ref Range    WBC 11 to 20 0 - 5 /hpf    RBC 4 to 0 0 - 5 /hpf    Epithelial cells FEW 0 - 5 /lpf    Bacteria 1+ (A) NEG /hpf    Hyaline cast 11 to 20 0 - 2 /lpf    Granular cast 11 to 20 NEG /lpf    Other: 3 to 5 SPERM     EKG, 12 LEAD, INITIAL    Collection Time: 04/11/22 12:36 AM   Result Value Ref Range    Ventricular Rate 84 BPM    Atrial Rate 84 BPM    P-R Interval 144 ms    QRS Duration 78 ms    Q-T Interval 418 ms    QTC Calculation (Bezet) 493 ms    Calculated P Axis 75 degrees    Calculated R Axis 38 degrees    Calculated T Axis 64 degrees    Diagnosis       Normal sinus rhythm  Prolonged QT  Abnormal ECG  No previous ECGs available     BLOOD GAS, ARTERIAL POC    Collection Time: 04/11/22 12:38 AM   Result Value Ref Range    Device: Non rebreather      FIO2 (POC) 100 %    pH (POC) 7.22 (LL) 7.35 - 7.45      pCO2 (POC) 57.5 (H) 35.0 - 45.0 MMHG    pO2 (POC) 207 (H) 80 - 100 MMHG    HCO3 (POC) 23.6 22 - 26 MMOL/L    sO2 (POC) 99.5 (H) 92 - 97 %    Base deficit (POC) 5.3 mmol/L    Allens test (POC) Positive      Site RIGHT RADIAL      Specimen type (POC) ARTERIAL      Performed by Janelle Good    TROPONIN-HIGH SENSITIVITY    Collection Time: 04/11/22  2:45 AM   Result Value Ref Range    Troponin-High Sensitivity 169 (HH) 0 - 78 ng/L   BLOOD GAS, ARTERIAL POC    Collection Time: 04/11/22  3:49 AM   Result Value Ref Range    Device: NASAL CANNULA      FIO2 (POC) 40 %    pH (POC) 7.19 (LL) 7.35 - 7.45      pCO2 (POC) 73.4 (H) 35.0 - 45.0 MMHG    pO2 (POC) 66 (L) 80 - 100 MMHG    HCO3 (POC) 28.1 (H) 22 - 26 MMOL/L    sO2 (POC) 87.9 (L) 92 - 97 %    Base deficit (POC) 3.0 mmol/L    Allens test (POC) Positive      Site RIGHT RADIAL      Patient temp. 97      Specimen type (POC) ARTERIAL      Performed by Kristal Galeano, POC    Collection Time: 04/11/22  6:38 AM   Result Value Ref Range    Glucose (POC) 106 70 - 110 mg/dL         Chemistry Recent Labs     04/11/22  0020   GLU 80      K 5.1      CO2 28   BUN 11   CREA 1.37*   CA 9.3   AGAP 7   BUCR 8*        Lactic Acid No results found for: LAC  No results for input(s): LAC in the last 72 hours. Liver Enzymes No results found for: TP, ALB, GLOB, AGRAT, AP, TBIL  No results for input(s): TP, ALB, GLOB, AGRAT, AP, TBIL in the last 72 hours.     No lab exists for component: SGOT, GPT, DBIL     CBC w/Diff Recent Labs     04/11/22  0020   WBC 25.9*   RBC 4.85   HGB 14.4   HCT 45.5   *   GRANS 83*   LYMPH 6*   EOS 0        Cardiac Enzymes Lab Results   Component Value Date/Time     04/11/2022 12:20 AM BNP No results found for: BNP, BNPP, XBNPT     Coagulation No results for input(s): PTP, INR, APTT, INREXT in the last 72 hours. Thyroid  No results found for: T4, T3U, TSH, TSHEXT    No results found for: T4     Urinalysis Lab Results   Component Value Date/Time    Color YELLOW 04/11/2022 12:20 AM    Appearance CLOUDY 04/11/2022 12:20 AM    Specific gravity 1.013 04/11/2022 12:20 AM    pH (UA) 5.0 04/11/2022 12:20 AM    Protein 100 (A) 04/11/2022 12:20 AM    Glucose Negative 04/11/2022 12:20 AM    Ketone Negative 04/11/2022 12:20 AM    Bilirubin Negative 04/11/2022 12:20 AM    Urobilinogen 1.0 04/11/2022 12:20 AM    Nitrites Negative 04/11/2022 12:20 AM    Leukocyte Esterase Negative 04/11/2022 12:20 AM    Epithelial cells FEW 04/11/2022 12:20 AM    Bacteria 1+ (A) 04/11/2022 12:20 AM    WBC 11 to 20 04/11/2022 12:20 AM    RBC 4 to 0 04/11/2022 12:20 AM        Micro  No results for input(s): SDES, CULT in the last 72 hours. No results for input(s): CULT in the last 72 hours. Culture data during this hospitalization. All Micro Results     None             CT HEAD WO CONT    Result Date: 4/11/2022  EXAM: CT head without contrast 4/11/2022 CLINICAL INDICATION/HISTORY:  Altered mental status. COMPARISON: None. TECHNIQUE: Serial axial images of the head were performed without intravenous contrast. Sagittal and coronal reformations were obtained from source images. Automated exposure control, mAs and/or kVp reductions based on patient size, and iterative reconstruction. The specific techniques utilized on this CT exam have been documented in the patient's electronic medical record. Digital imaging and communications and medicine (DICOM) format image data are available to nonaffiliated external healthcare facilities or entities on a secure, media free, reciprocally searchable basis with patient authorization for at least a 12 month period after this study.  _______________ FINDINGS: BRAIN: The sulci, folia, ventricles and basal cisterns are within normal limits for the patient's age. There is no intracranial hemorrhage, mass effect, or midline shift. There are no areas of abnormal parenchymal attenuation. EXTRA-AXIAL SPACES AND MENINGES: There are no abnormal extra-axial fluid collections. CALVARIUM: Intact. OTHER: The visualized portions of the paranasal sinuses and mastoid air cells are clear. _______________     Unremarkable noncontrast head CT. Images report reviewed by me:  CT (Most Recent) (CT chest reviewed by me) Results from Hospital Encounter encounter on 04/11/22    CT HEAD WO CONT    Narrative  EXAM: CT head without contrast 4/11/2022    CLINICAL INDICATION/HISTORY:  Altered mental status. COMPARISON: None. TECHNIQUE: Serial axial images of the head were performed without intravenous  contrast.  Sagittal and coronal reformations were obtained from source images. Automated  exposure control, mAs and/or kVp reductions based on patient size, and iterative  reconstruction. The specific techniques utilized on this CT exam have been  documented in the patient's electronic medical record. Digital imaging and communications and medicine (DICOM) format image data are  available to nonaffiliated external healthcare facilities or entities on a  secure, media free, reciprocally searchable basis with patient authorization for  at least a 12 month period after this study. _______________    FINDINGS:    BRAIN: The sulci, folia, ventricles and basal cisterns are within normal limits  for the patient's age. There is no intracranial hemorrhage, mass effect, or  midline shift. There are no areas of abnormal parenchymal attenuation. EXTRA-AXIAL SPACES AND MENINGES: There are no abnormal extra-axial fluid  collections. CALVARIUM: Intact. OTHER: The visualized portions of the paranasal sinuses and mastoid air cells  are clear.    _______________    Impression  Unremarkable noncontrast head CT. CXR reviewed by me:  XR (Most Recent). CXR  reviewed by me and compared with previous CXR No results found for this or any previous visit. ·Please note: Voice-recognition software may have been used to generate this report, which may have resulted in some phonetic-based errors in grammar and contents. Even though attempts were made to correct all the mistakes, some may have been missed, and remained in the body of the document.       Michael Avitia MD  4/11/2022

## 2022-04-11 NOTE — ED NOTES
Will hold heparin, Dr. Khanh ealr with trending trop, echo.    Okay for proph hep    Shantanu Caldwell MD, [facility]  Emergency Medicine  4/11/2022, 5:40 AM

## 2022-04-11 NOTE — PROGRESS NOTES
Bedside shift change report received from RAHUL Duncan RN. Report included the following information SBAR, Kardex, Intake/Output, MAR, Recent Results, Med Rec Status and Cardiac Rhythm NSR and plan of care. Pt awakened, sat straight up in bed, was hitting/kicking/headbutting. No command following. Attempting to pull lines and tubes. Not rediretable. No command following. Sedation increased. MD at bedside. Niece arrived, stated patient was not  but did have an adult child, Jesusita Corado, whose phone number is [phone]. Son called and was given an update. Pt with several aggressive outburst. No command following. Medicated per MAR. Bedside shift change report given EDWARD Roth RN. Report included the following information SBAR, Kardex, Intake/Output, MAR, Recent Results, Med Rec Status and Cardiac Rhythm NSR and plan of care.

## 2022-04-11 NOTE — ROUTINE PROCESS
TRANSFER - IN REPORT:    Verbal report received from MACKENZIE Trejo RN(name) on Emogene Grams  being received from ED(unit) for routine progression of care      Report consisted of patients Situation, Background, Assessment and   Recommendations(SBAR). Information from the following report(s) SBAR, Kardex, ED Summary, Intake/Output, MAR and Recent Results was reviewed with the receiving nurse. Opportunity for questions and clarification was provided. Assessment completed upon patients arrival to unit and care assumed.

## 2022-04-11 NOTE — H&P
History & Physical    Patient: Javier Moreno MRN: [id number]  CSN: [id number]    YOB: 1970  Age: 46 y.o. Sex: male      DOA: 4/11/2022  Primary Care Provider:  None      Assessment/Plan   Javier Moreno is a 46 y.o. male who presents to the ED via EMS for Drug overdose OD;  Per EMS pt found with snoring respiration and unresponsive. Admitted to the ICU for acute hypoxemic respiratory failure due to drug overdose, drug overdose, cocaine use, NSTEMI. CRITICAL CARE PLAN    Resp -acute respiratory failure with hypercapnia and hypoxemia due to drug overdose, admitted in ED for failure to be able to maintain airway and level of sedation, see vent orders, VAP bundle. HOB>30 degrees. Bronchodilators. Follow sputum cx. Daily CXR. ID -no signs or symptoms of infection, trend temps, wbc    CVS -NSTEMI versus demand ischemia, elevated troponin, trend troponin levels, echocardiogram, cardiology consulted by ED and following, Per ED physicians conversation with cardiology, will not start heparin at this time we will just obtain echo and trend troponins    Heme/onc - Follow H&H, plts. INR. Renal - Trend BUN, Cr, follow I/O, gandhi in place. Check and replace Mg, K, phos. Endocrine -  Follow FSG    Neuro -very lethargic and unable to maintain safe airway even after repeated doses of Narcan, drug overdose requiring multiple doses of Narcan, urine drug screen positive for cocaine and THC, will trying to get head CT, patient combative and unable to safely obtain CT scan but now that patient is intubated reattempt will be made, to make sure that patient does not have any bleed before heparin started    Pain -pain management as needed    Sedation - Fentanyl, ativan/versed prn. Sedation bundle, Precedex drip started    GI - NPO for now.  NG tube    Prophylaxis - DVT be covered by heparin prophylaxis as long as patient does not have brain bleed, GI: protonix    Patient Active Problem List   Diagnosis Code    NSTEMI (non-ST elevated myocardial infarction) ([address] I21.4    Acute respiratory failure with hypoxemia (HCC) J96.01    Hypercapnia R06.89    Drug overdose T50.901A    Cocaine use F14.90     Estimated length of stay : 5-6 days     CC: Drug overdose        HPI:     Bridget Kovacs is a 46 y.o. male who presents to the ED via EMS for Drug overdose OD;  Per EMS pt found with snoring respiration and unresponsive;  Bystander CPR was being performed when EMS arrived pt had strong pulse and stopped CPR; Some question of loss of pulse and police on scene did CPR, but pt still with unstable respirations and unresponsive. Patient was assisted with breathing via bag-valve-mask. EMS gave Narcan 2 mg IV and without much change in status. Arrival to ED patient was placed on nonrebreather mask at 10 L. Patient after arrival to the ED became combative attempting to hit staff and get out of bed. Redirectable efforts were done without success. Patient had to be placed in four-point restraints. Patient received Narcan in the ED ended up requiring 5 mg of Narcan. Patient intermittently awake but is lethargic and goes back to being unresponsive. Concerned about patient's airway remained. ABG obtained showed a pH of 7.22, CO2 of 57.5 and a PO2 of 207 and bicarb of 23.6 on nonrebreather. She continued to have trouble maintaining consciousness and repeat ABG was obtained which showed worsening pH dropped to 7.19, PCO2 increased to 73.4 and PO2 dropped to 66 ED physician decided to intubate patient. Intensivist/Dr. Enrrique Diehl was consulted and agreed with intubation and ICU admission. Plan is to get CT head after intubation as pt has been too unable to get before. No past medical history on file. No past surgical history on file. No family history on file.     Social History     Socioeconomic History    Marital status: SINGLE       Prior to Admission medications    Not on File       No Known Allergies    Review of Systems  Unable to obtain due to patient condition  Physical Exam:     Physical Exam:  Visit Vitals  BP (!) 162/88   Pulse (!) 105   Temp 97.7 °F (36.5 °C)   Resp 18   Wt 67.7 kg (149 lb 4 oz)   SpO2 95%    O2 Flow Rate (L/min): 2 l/min O2 Device: Nasal cannula    Temp (24hrs), Av.7 °F (36.5 °C), Min:97.7 °F (36.5 °C), Max:97.7 °F (36.5 °C)    04/10 1901 -  0700  In: -   Out: 1200 [Urine:1200]   No intake/output data recorded. General:   Obtunded with snoring respirations and periods of apnea   Head:  Normocephalic, without obvious abnormality, atraumatic. Eyes:  Conjunctivae/corneas clear, sclera anicteric, PERRL, EOMs intact. Nose: Nares normal. No drainage or sinus tenderness. Throat: Lips, mucosa, and tongue normal.    Neck: Supple, symmetrical, trachea midline, no adenopathy. Lungs:   Clear to auscultation bilaterally. Heart:  Regular rate and rhythm, S1, S2 normal, no murmur, click, rub or gallop. Abdomen: Soft, non-tender. Bowel sounds normal. No masses,  No organomegaly. Extremities: Extremities normal, atraumatic, no cyanosis or edema. Capillary refill normal.   Pulses: 2+ and symmetric all extremities.    Skin: Skin color as per ethnicity, turgor normal. No rashes or lesions   Neurologic:  Chlortab status, patient is obtunded and combative, but moving all extremities equally       Labs Reviewed:  Recent Results (from the past 24 hour(s))   CBC WITH AUTOMATED DIFF    Collection Time: 22 12:20 AM   Result Value Ref Range    WBC 25.9 (H) 4.6 - 13.2 K/uL    RBC 4.85 4.35 - 5.65 M/uL    HGB 14.4 13.0 - 16.0 g/dL    HCT 45.5 36.0 - 48.0 %    MCV 93.8 78.0 - 100.0 FL    MCH 29.7 24.0 - 34.0 PG    MCHC 31.6 31.0 - 37.0 g/dL    RDW 13.1 11.6 - 14.5 %    PLATELET 455 (H) 912 - 420 K/uL    MPV 9.2 9.2 - 11.8 FL    NRBC 0.0 0  WBC    ABSOLUTE NRBC 0.00 0.00 - 0.01 K/uL    NEUTROPHILS 83 (H) 40 - 73 %    LYMPHOCYTES 6 (L) 21 - 52 %    MONOCYTES 8 3 - 10 %    EOSINOPHILS 0 0 - 5 %    BASOPHILS 0 0 - 2 %    IMMATURE GRANULOCYTES 3 (H) 0.0 - 0.5 %    ABS. NEUTROPHILS 21.4 (H) 1.8 - 8.0 K/UL    ABS. LYMPHOCYTES 1.6 0.9 - 3.6 K/UL    ABS. MONOCYTES 2.1 (H) 0.05 - 1.2 K/UL    ABS. EOSINOPHILS 0.0 0.0 - 0.4 K/UL    ABS. BASOPHILS 0.1 0.0 - 0.1 K/UL    ABS. IMM.  GRANS. 0.7 (H) 0.00 - 0.04 K/UL    DF AUTOMATED     METABOLIC PANEL, BASIC    Collection Time: 04/11/22 12:20 AM   Result Value Ref Range    Sodium 139 136 - 145 mmol/L    Potassium 5.1 3.5 - 5.5 mmol/L    Chloride 104 100 - 111 mmol/L    CO2 28 21 - 32 mmol/L    Anion gap 7 3.0 - 18 mmol/L    Glucose 80 74 - 99 mg/dL    BUN 11 7.0 - 18 MG/DL    Creatinine 1.37 (H) 0.6 - 1.3 MG/DL    BUN/Creatinine ratio 8 (L) 12 - 20      GFR est AA >60 >60 ml/min/1.73m2    GFR est non-AA 55 (L) >60 ml/min/1.73m2    Calcium 9.3 8.5 - 10.1 MG/DL   TROPONIN-HIGH SENSITIVITY    Collection Time: 04/11/22 12:20 AM   Result Value Ref Range    Troponin-High Sensitivity 114 (HH) 0 - 78 ng/L   URINALYSIS W/ RFLX MICROSCOPIC    Collection Time: 04/11/22 12:20 AM   Result Value Ref Range    Color YELLOW      Appearance CLOUDY      Specific gravity 1.013 1.005 - 1.030      pH (UA) 5.0 5.0 - 8.0      Protein 100 (A) NEG mg/dL    Glucose Negative NEG mg/dL    Ketone Negative NEG mg/dL    Bilirubin Negative NEG      Blood SMALL (A) NEG      Urobilinogen 1.0 0.2 - 1.0 EU/dL    Nitrites Negative NEG      Leukocyte Esterase Negative NEG     CK    Collection Time: 04/11/22 12:20 AM   Result Value Ref Range     39 - 308 U/L   ETHYL ALCOHOL    Collection Time: 04/11/22 12:20 AM   Result Value Ref Range    ALCOHOL(ETHYL),SERUM 15 (H) 0 - 3 MG/DL   DRUG SCREEN, URINE    Collection Time: 04/11/22 12:20 AM   Result Value Ref Range    BENZODIAZEPINES Negative NEG      BARBITURATES Negative NEG      THC (TH-CANNABINOL) Positive (A) NEG      OPIATES Negative NEG      PCP(PHENCYCLIDINE) Negative NEG      COCAINE Positive (A) NEG      AMPHETAMINES Negative NEG      METHADONE Negative NEG      HDSCOM (NOTE)    URINE MICROSCOPIC ONLY    Collection Time: 04/11/22 12:20 AM   Result Value Ref Range    WBC 11 to 20 0 - 5 /hpf    RBC 4 to 0 0 - 5 /hpf    Epithelial cells FEW 0 - 5 /lpf    Bacteria 1+ (A) NEG /hpf    Hyaline cast 11 to 20 0 - 2 /lpf    Granular cast 11 to 20 NEG /lpf    Other: 3 to 5 SPERM     EKG, 12 LEAD, INITIAL    Collection Time: 04/11/22 12:36 AM   Result Value Ref Range    Ventricular Rate 84 BPM    Atrial Rate 84 BPM    P-R Interval 144 ms    QRS Duration 78 ms    Q-T Interval 418 ms    QTC Calculation (Bezet) 493 ms    Calculated P Axis 75 degrees    Calculated R Axis 38 degrees    Calculated T Axis 64 degrees    Diagnosis       Normal sinus rhythm  Prolonged QT  Abnormal ECG  No previous ECGs available     BLOOD GAS, ARTERIAL POC    Collection Time: 04/11/22 12:38 AM   Result Value Ref Range    Device: Non rebreather      FIO2 (POC) 100 %    pH (POC) 7.22 (LL) 7.35 - 7.45      pCO2 (POC) 57.5 (H) 35.0 - 45.0 MMHG    pO2 (POC) 207 (H) 80 - 100 MMHG    HCO3 (POC) 23.6 22 - 26 MMOL/L    sO2 (POC) 99.5 (H) 92 - 97 %    Base deficit (POC) 5.3 mmol/L    Allens test (POC) Positive      Site RIGHT RADIAL      Specimen type (POC) ARTERIAL      Performed by Serafin Muñoz    TROPONIN-HIGH SENSITIVITY    Collection Time: 04/11/22  2:45 AM   Result Value Ref Range    Troponin-High Sensitivity 169 (HH) 0 - 78 ng/L   BLOOD GAS, ARTERIAL POC    Collection Time: 04/11/22  3:49 AM   Result Value Ref Range    Device: NASAL CANNULA      FIO2 (POC) 40 %    pH (POC) 7.19 (LL) 7.35 - 7.45      pCO2 (POC) 73.4 (H) 35.0 - 45.0 MMHG    pO2 (POC) 66 (L) 80 - 100 MMHG    HCO3 (POC) 28.1 (H) 22 - 26 MMOL/L    sO2 (POC) 87.9 (L) 92 - 97 %    Base deficit (POC) 3.0 mmol/L    Allens test (POC) Positive      Site RIGHT RADIAL      Patient temp.  97      Specimen type (POC) ARTERIAL      Performed by Serafin Muñoz        Procedures/imaging: see electronic medical records for all procedures/Xrays and details which were not copied into this note but were reviewed prior to creation of Plan    [id number], 60 minutes of critical care time spent in the direct evaluation and treatment of this high risk patient. The reason for providing this level of medical care for this critically ill patient was due a critical illness that impaired one or more vital organ systems such that there was a high probability of imminent or life threatening deterioration in the patients condition. This care involved high complexity decision making to assess, manipulate, and support vital system functions, to treat this degreee vital organ system failure and to prevent further life threatening deterioration of the patients condition.       CC: None

## 2022-04-11 NOTE — ED NOTES
Pt combative and attempting to hit staff and get out of bed; Attempted to re-direct pt without success; Pt placed in 4 point velcro restraints;  NVI;;   Roque inserted; RT at bedside to obtain ABG;  NPA inserted into right nare without difficulty;   Pt remains on NRB

## 2022-04-11 NOTE — PROGRESS NOTES
Care Management    Reason for Admission: Acute respiratory failure with hypoxemia    Chart reviewed. Per H&P: \"Richrad Alejo is a 46 y.o. male who presents to the ED via EMS for Drug overdose OD;  Per EMS pt found with snoring respiration and unresponsive;  Bystander CPR was being performed when EMS arrived pt had strong pulse and stopped CPR; Some question of loss of pulse and police on scene did CPR, but pt still with unstable respirations and unresponsive. Patient was assisted with breathing via bag-valve-mask. EMS gave Narcan 2 mg IV and without much change in status. Arrival to ED patient was placed on nonrebreather mask at 10 L. Patient after arrival to the ED became combative attempting to hit staff and get out of bed. Redirectable efforts were done without success. Patient had to be placed in four-point restraints. Patient received Narcan in the ED ended up requiring 5 mg of Narcan. Patient intermittently awake but is lethargic and goes back to being unresponsive. Concerned about patient's airway remained. ABG obtained showed a pH of 7.22, CO2 of 57.5 and a PO2 of 207 and bicarb of 23.6 on nonrebreather. She continued to have trouble maintaining consciousness and repeat ABG was obtained which showed worsening pH dropped to 7.19, PCO2 increased to 73.4 and PO2 dropped to 66 ED physician decided to intubate patient. Intensivist/Dr. Noy Lowe was consulted and agreed with intubation and ICU admission.   Plan is to get CT head after intubation as pt has been too unable to get before.        Prior to admission patient was living:     Prior to admission patient was using the following DME:                     RUR Score:  5%                  Plan for utilizing home health:  TBD        COVID Vaccine Status:     PCP: First and Last name: None    Name of Practice:    Are you a current patient: Yes/No:    Approximate date of last visit:    Can you participate in a virtual visit with your PCP: Current Advanced Directive/Advance Care Plan: Full Code    Healthcare Decision Maker:   Primary Decision Maker: Lexi Miller - Denis - [phone]  Click here to complete [place] including selection of the Healthcare Decision Maker Relationship (ie \"Primary\")                         Transition of Care Plan: In progress     Chart Reviewed. CM notes patient intubated due to worsening ABGs. Patient brought in by EMS after being found unresponsive with bystander performing CPR. Per nursing, the patient's son is driving to see him.

## 2022-04-11 NOTE — PROGRESS NOTES
attempted to visit with  Patient but he was vented and  unavailable. No family was present at time of visit. Chaplains remain available to provide pastoral support to patient and family as needed and requested. Rev. Alethea Mistry  [phone]

## 2022-04-11 NOTE — Clinical Note
Status[de-identified] INPATIENT [101]   Type of Bed: Intensive Care [6]   Cardiac Monitoring Required?: Yes   Inpatient Hospitalization Certified Necessary for the Following Reasons: 4.  Patient requires ICU level of care interventions (further clarification in H&P documentation)   Admitting Diagnosis: NSTEMI (non-ST elevated myocardial infarction) [facility]) [[id number]]   Admitting Physician: Cristal Roche [[id number]]   Attending Physician: Cristal Roche [[id number]]   Estimated Length of Stay: 3-4 Midnights   Discharge Plan[de-identified] Home with Office Follow-up

## 2022-04-11 NOTE — CONSULTS
[facility] Consult Note      Patient: Bridget Kovacs MRN: [id number]  SSN: [id number]    YOB: 1970  Age: 46 y.o. Sex: male        Date of Consultation: 4/11/2022  Referring Physician: Dr. Rosalba Choudhury  Reason for Consultation: elevated troponin    HPI:  I was asked by Pricilla Valdez and Dr. Gregorio Canas to see this patient for minimally elevated troponin. Bridget Kovacs is a 78-year-old gentleman came to the hospital with drug overdose. As per medical record patient was snoring unresponsive CPR was performed by bystanders and EMS was called in. Patient was given Narcan without any significant improvement. Patient have respiratory depression intubated and found to have minimal troponin elevation as well. Cardiology consult was called for troponin elevation. At this point most of the information is gathered from medical, father is unable to give significant history. No past medical history on file. No past surgical history on file.   Current Facility-Administered Medications   Medication Dose Route Frequency    fentaNYL (PF) 900 mcg/30 ml infusion soln  0-200 mcg/hr IntraVENous TITRATE    dexmedeTOMidine (PRECEDEX) 400 mcg in 0.9% sodium chloride 100 mL infusion  0.1-1.5 mcg/kg/hr IntraVENous TITRATE    lactated Ringers infusion  75 mL/hr IntraVENous CONTINUOUS    sodium chloride (NS) flush 5-40 mL  5-40 mL IntraVENous Q8H    sodium chloride (NS) flush 5-40 mL  5-40 mL IntraVENous PRN    acetaminophen (TYLENOL) tablet 650 mg  650 mg Oral Q6H PRN    Or    acetaminophen (TYLENOL) suppository 650 mg  650 mg Rectal Q6H PRN    polyethylene glycol (MIRALAX) packet 17 g  17 g Oral DAILY PRN    potassium chloride 10 mEq in 100 ml IVPB  10 mEq IntraVENous PRN    magnesium sulfate 2 g/50 ml IVPB (premix or compounded)  2 g IntraVENous PRN    pantoprazole (PROTONIX) 40 mg in 0.9% sodium chloride 10 mL injection  40 mg IntraVENous DAILY    midazolam (VERSED) injection 1 mg  1 mg IntraVENous Q4H PRN    fentaNYL citrate (PF) injection  mcg   mcg IntraVENous Q4H PRN    chlorhexidine (PERIDEX) 0.12 % mouthwash 10 mL  10 mL Oral Q12H    ELECTROLYTE REPLACEMENT PROTOCOL - Potassium Standard Dosing   1 Each Other PRN    ELECTROLYTE REPLACEMENT PROTOCOL - Magnesium   1 Each Other PRN    ELECTROLYTE REPLACEMENT PROTOCOL - Phosphorus  Standard Dosing  1 Each Other PRN    ELECTROLYTE REPLACEMENT PROTOCOL - Calcium   1 Each Other PRN    heparin (porcine) injection 5,000 Units  5,000 Units SubCUTAneous Q8H    piperacillin-tazobactam (ZOSYN) 3.375 g in 0.9% sodium chloride (MBP/ADV) 100 mL MBP  3.375 g IntraVENous Q8H    NOREPINephrine (LEVOPHED) 8 mg in 5% dextrose 250mL (32 mcg/mL) infusion  0.5-16 mcg/min IntraVENous TITRATE    midazolam in normal saline (VERSED) 1 mg/mL infusion  0-10 mg/hr IntraVENous TITRATE       Allergies and Intolerances:   No Known Allergies    Family History:   No family history on file. Social History:   He  has no history on file for tobacco use. He  has no history on file for alcohol use.           Review of Systems  Limited due to intubation      Physical:   Patient Vitals for the past 6 hrs:   Temp Pulse Resp BP SpO2   04/11/22 1615 -- 74 20 130/80 100 %   04/11/22 1602 -- 69 20 -- 100 %   04/11/22 1600 98.4 °F (36.9 °C) 71 20 130/87 100 %   04/11/22 1545 -- 74 20 (!) 135/92 100 %   04/11/22 1530 -- 66 20 -- 100 %   04/11/22 1515 -- 62 20 (!) 145/94 100 %   04/11/22 1500 -- 63 20 (!) 145/88 100 %   04/11/22 1445 -- 65 20 (!) 148/89 100 %   04/11/22 1430 -- 80 20 110/67 98 %   04/11/22 1415 -- 73 20 137/87 100 %   04/11/22 1400 -- 68 20 137/85 100 %   04/11/22 1345 -- 64 20 (!) 140/92 100 %   04/11/22 1330 -- 77 20 110/66 98 %   04/11/22 1315 -- 75 20 -- 100 %   04/11/22 1300 -- 76 20 97/65 100 %   04/11/22 1245 -- 79 20 -- 98 %   04/11/22 1230 -- 79 20 (!) 88/58 98 %   04/11/22 1207 98 °F (36.7 °C) 86 20 132/73 100 %   04/11/22 1149 -- 73 20 (!) 85/60 99 %   04/11/22 1143 -- 75 20 -- 99 %         Exam:   General Appearance: Intubated  HEENT: JORDAN. HEAD: Atraumatic  NECK: No JVD, no thyroidomeglay. LUNGS: Clear bilaterally. HEART: S1+S2     ABD: Non-tender, BS Audible    EXT: No edema, and no cysnosis. VASCULAR EXAM: Pulses are intact. PSYCHIATRIC EXAM: Intubated  MUSCULOSKELETAL: Grossly no joint deformity. NEUROLOGICAL: Intubated. Review of Data:   LABS:   Lab Results   Component Value Date/Time    WBC 25.9 (H) 04/11/2022 12:20 AM    HGB 14.4 04/11/2022 12:20 AM    HCT 45.5 04/11/2022 12:20 AM    PLATELET 484 (H) 10/99/8073 12:20 AM     Lab Results   Component Value Date/Time    Sodium 139 04/11/2022 12:20 AM    Potassium 5.1 04/11/2022 12:20 AM    Chloride 104 04/11/2022 12:20 AM    CO2 28 04/11/2022 12:20 AM    Glucose 80 04/11/2022 12:20 AM    BUN 11 04/11/2022 12:20 AM    Creatinine 1.37 (H) 04/11/2022 12:20 AM     No results found for: CHOL, CHOLX, CHLST, CHOLV, HDL, HDLP, LDL, LDLC, DLDLP, TGLX, TRIGL, TRIGP  No components found for: GPT  No results found for: HBA1C, XYW4GNRU, SOI1RFAX    RADIOLOGY:  CT Results  (Last 48 hours)               04/11/22 0627  CT HEAD WO CONT Final result    Impression:      Unremarkable noncontrast head CT. Narrative:  EXAM: CT head without contrast 4/11/2022       CLINICAL INDICATION/HISTORY:  Altered mental status. COMPARISON: None. TECHNIQUE: Serial axial images of the head were performed without intravenous   contrast.   Sagittal and coronal reformations were obtained from source images. Automated   exposure control, mAs and/or kVp reductions based on patient size, and iterative   reconstruction. The specific techniques utilized on this CT exam have been   documented in the patient's electronic medical record.        Digital imaging and communications and medicine (DICOM) format image data are   available to nonaffiliated external healthcare facilities or entities on a   secure, media free, reciprocally searchable basis with patient authorization for   at least a 12 month period after this study. _______________       FINDINGS:       BRAIN: The sulci, folia, ventricles and basal cisterns are within normal limits   for the patient's age. There is no intracranial hemorrhage, mass effect, or   midline shift. There are no areas of abnormal parenchymal attenuation. EXTRA-AXIAL SPACES AND MENINGES: There are no abnormal extra-axial fluid   collections. CALVARIUM: Intact. OTHER: The visualized portions of the paranasal sinuses and mastoid air cells   are clear.       _______________               CXR Results  (Last 48 hours)               04/11/22 0559  XR CHEST PORT Final result    Impression:      No acute cardiopulmonary abnormality. Interval intubation and enteric tube placement. Narrative:  EXAM: XR CHEST PORT       CLINICAL INDICATION/HISTORY: intubation   -Additional: None       COMPARISON: Earlier the same day       TECHNIQUE: Portable frontal view of the chest       _______________       FINDINGS:       SUPPORT DEVICES: Interval intubation and enteric tube placement. HEART AND MEDIASTINUM: Cardiomediastinal silhouette within normal limits. LUNGS AND PLEURAL SPACES: No dense consolidation, large effusion or   pneumothorax.       _______________           04/11/22 0055  XR CHEST PORT Final result    Impression:      Mild increase in pulmonary interstitial markings, potentially chronic, though   mild interstitial edema not excluded. Narrative:  CLINICAL HISTORY: Found unresponsive. COMPARISONS:  None. TECHNIQUE:  single frontal view of the chest       ------------------------------------------       FINDINGS:       Lungs:  No consolidation or pleural fluid. Mild increase in pulmonary   interstitial markings. Mediastinum: Unremarkable.        Bones: No evidence of fracture or suspicious bone lesion.           ------------------------------------------ Cardiology Procedures:   Results for orders placed or performed during the hospital encounter of 04/11/22   EKG, 12 LEAD, INITIAL   Result Value Ref Range    Ventricular Rate 84 BPM    Atrial Rate 84 BPM    P-R Interval 144 ms    QRS Duration 78 ms    Q-T Interval 418 ms    QTC Calculation (Bezet) 493 ms    Calculated P Axis 75 degrees    Calculated R Axis 38 degrees    Calculated T Axis 64 degrees    Diagnosis       Normal sinus rhythm  Prolonged QT  Abnormal ECG  No previous ECGs available        Echo Results  (Last 48 hours)    None       Cardiolite (Tc-99m Sestamibi) stress test        Impression / Plan:    Patient Active Problem List   Diagnosis Code    NSTEMI (non-ST elevated myocardial infarction) ([facility]) I21.4    Acute respiratory failure with hypoxemia ([facility]) J96.01    Hypercapnia R06.89    Drug overdose T50.901A    Cocaine use F14.90         Assessment and plan    Minimal troponin elevation  Polysubstance abuse  Acute respiratory failure requiring intubation    Plan. Minimal troponin elevation due to substance abuse and respiratory failure  I do not think this is acute ischemic coronary event  Echocardiogram EF is low normal  Patient will need ischemic cardiac testing with stress test before discharge  Discussed with father in the room.       Signed By: Gordy Ying MD     April 11, 2022

## 2022-04-12 ENCOUNTER — APPOINTMENT (OUTPATIENT)
Dept: GENERAL RADIOLOGY | Age: 52
DRG: 917 | End: 2022-04-12
Attending: INTERNAL MEDICINE

## 2022-04-12 LAB
ALBUMIN SERPL-MCNC: 2.6 G/DL (ref 3.4–5)
ALBUMIN/GLOB SERPL: 0.7 {RATIO} (ref 0.8–1.7)
ALP SERPL-CCNC: 82 U/L (ref 45–117)
ALT SERPL-CCNC: 40 U/L (ref 16–61)
ANION GAP SERPL CALC-SCNC: 3 MMOL/L (ref 3–18)
ARTERIAL PATENCY WRIST A: ABNORMAL
AST SERPL-CCNC: 95 U/L (ref 10–38)
BASE EXCESS BLD CALC-SCNC: 3.8 MMOL/L
BASOPHILS # BLD: 0 K/UL (ref 0–0.1)
BASOPHILS NFR BLD: 0 % (ref 0–2)
BDY SITE: ABNORMAL
BILIRUB DIRECT SERPL-MCNC: 0.1 MG/DL (ref 0–0.2)
BILIRUB SERPL-MCNC: 0.5 MG/DL (ref 0.2–1)
BODY TEMPERATURE: 98.9
BUN SERPL-MCNC: 14 MG/DL (ref 7–18)
BUN/CREAT SERPL: 16 (ref 12–20)
CA-I SERPL-SCNC: 1.21 MMOL/L (ref 1.12–1.32)
CALCIUM SERPL-MCNC: 8.8 MG/DL (ref 8.5–10.1)
CHLORIDE SERPL-SCNC: 108 MMOL/L (ref 100–111)
CO2 SERPL-SCNC: 31 MMOL/L (ref 21–32)
CREAT SERPL-MCNC: 0.87 MG/DL (ref 0.6–1.3)
DIFFERENTIAL METHOD BLD: ABNORMAL
EOSINOPHIL # BLD: 0 K/UL (ref 0–0.4)
EOSINOPHIL NFR BLD: 0 % (ref 0–5)
ERYTHROCYTE [DISTWIDTH] IN BLOOD BY AUTOMATED COUNT: 13 % (ref 11.6–14.5)
GAS FLOW.O2 O2 DELIVERY SYS: ABNORMAL L/MIN
GAS FLOW.O2 SETTING OXYMISER: 20 BPM
GLOBULIN SER CALC-MCNC: 3.9 G/DL (ref 2–4)
GLUCOSE BLD STRIP.AUTO-MCNC: 171 MG/DL (ref 70–110)
GLUCOSE BLD STRIP.AUTO-MCNC: 65 MG/DL (ref 70–110)
GLUCOSE BLD STRIP.AUTO-MCNC: 66 MG/DL (ref 70–110)
GLUCOSE BLD STRIP.AUTO-MCNC: 78 MG/DL (ref 70–110)
GLUCOSE SERPL-MCNC: 77 MG/DL (ref 74–99)
HCO3 BLD-SCNC: 29.5 MMOL/L (ref 22–26)
HCT VFR BLD AUTO: 40.3 % (ref 36–48)
HGB BLD-MCNC: 13 G/DL (ref 13–16)
IMM GRANULOCYTES # BLD AUTO: 0.1 K/UL (ref 0–0.04)
IMM GRANULOCYTES NFR BLD AUTO: 1 % (ref 0–0.5)
INSPIRATION.DURATION SETTING TIME VENT: 0.85 SEC
LYMPHOCYTES # BLD: 1.6 K/UL (ref 0.9–3.6)
LYMPHOCYTES NFR BLD: 13 % (ref 21–52)
MAGNESIUM SERPL-MCNC: 2.1 MG/DL (ref 1.6–2.6)
MCH RBC QN AUTO: 29.5 PG (ref 24–34)
MCHC RBC AUTO-ENTMCNC: 32.3 G/DL (ref 31–37)
MCV RBC AUTO: 91.4 FL (ref 78–100)
MONOCYTES # BLD: 1 K/UL (ref 0.05–1.2)
MONOCYTES NFR BLD: 8 % (ref 3–10)
NEUTS SEG # BLD: 10.3 K/UL (ref 1.8–8)
NEUTS SEG NFR BLD: 78 % (ref 40–73)
NRBC # BLD: 0 K/UL (ref 0–0.01)
NRBC BLD-RTO: 0 PER 100 WBC
O2/TOTAL GAS SETTING VFR VENT: 35 %
PAW @ MEAN EXP FLOW ON VENT: 9.1 CMH2O
PCO2 BLD: 47.7 MMHG (ref 35–45)
PEEP RESPIRATORY: 5 CMH2O
PH BLD: 7.4 [PH] (ref 7.35–7.45)
PHOSPHATE SERPL-MCNC: 2.5 MG/DL (ref 2.5–4.9)
PIP ISTAT,IPIP: 20
PLATELET # BLD AUTO: 364 K/UL (ref 135–420)
PMV BLD AUTO: 8.8 FL (ref 9.2–11.8)
PO2 BLD: 96 MMHG (ref 80–100)
POTASSIUM SERPL-SCNC: 4.1 MMOL/L (ref 3.5–5.5)
PROT SERPL-MCNC: 6.5 G/DL (ref 6.4–8.2)
RBC # BLD AUTO: 4.41 M/UL (ref 4.35–5.65)
SAO2 % BLD: 97.3 % (ref 92–97)
SERVICE CMNT-IMP: ABNORMAL
SODIUM SERPL-SCNC: 142 MMOL/L (ref 136–145)
SPECIMEN TYPE: ABNORMAL
TOTAL RESP. RATE, ITRR: 20
TROPONIN-HIGH SENSITIVITY: 42 NG/L (ref 0–78)
VENTILATION MODE VENT: ABNORMAL
VT SETTING VENT: 450 ML
WBC # BLD AUTO: 13.2 K/UL (ref 4.6–13.2)

## 2022-04-12 PROCEDURE — 77010033678 HC OXYGEN DAILY

## 2022-04-12 PROCEDURE — 82803 BLOOD GASES ANY COMBINATION: CPT

## 2022-04-12 PROCEDURE — 71045 X-RAY EXAM CHEST 1 VIEW: CPT

## 2022-04-12 PROCEDURE — 82962 GLUCOSE BLOOD TEST: CPT

## 2022-04-12 PROCEDURE — 36600 WITHDRAWAL OF ARTERIAL BLOOD: CPT

## 2022-04-12 PROCEDURE — 74011000258 HC RX REV CODE- 258: Performed by: STUDENT IN AN ORGANIZED HEALTH CARE EDUCATION/TRAINING PROGRAM

## 2022-04-12 PROCEDURE — C9113 INJ PANTOPRAZOLE SODIUM, VIA: HCPCS | Performed by: FAMILY MEDICINE

## 2022-04-12 PROCEDURE — A4217 STERILE WATER/SALINE, 500 ML: HCPCS

## 2022-04-12 PROCEDURE — 80048 BASIC METABOLIC PNL TOTAL CA: CPT

## 2022-04-12 PROCEDURE — 36415 COLL VENOUS BLD VENIPUNCTURE: CPT

## 2022-04-12 PROCEDURE — 94003 VENT MGMT INPAT SUBQ DAY: CPT

## 2022-04-12 PROCEDURE — 74011250636 HC RX REV CODE- 250/636: Performed by: INTERNAL MEDICINE

## 2022-04-12 PROCEDURE — 74011250636 HC RX REV CODE- 250/636: Performed by: FAMILY MEDICINE

## 2022-04-12 PROCEDURE — 74011250636 HC RX REV CODE- 250/636: Performed by: STUDENT IN AN ORGANIZED HEALTH CARE EDUCATION/TRAINING PROGRAM

## 2022-04-12 PROCEDURE — 84484 ASSAY OF TROPONIN QUANT: CPT

## 2022-04-12 PROCEDURE — 82330 ASSAY OF CALCIUM: CPT

## 2022-04-12 PROCEDURE — 84100 ASSAY OF PHOSPHORUS: CPT

## 2022-04-12 PROCEDURE — 83735 ASSAY OF MAGNESIUM: CPT

## 2022-04-12 PROCEDURE — 74011000250 HC RX REV CODE- 250: Performed by: INTERNAL MEDICINE

## 2022-04-12 PROCEDURE — 85025 COMPLETE CBC W/AUTO DIFF WBC: CPT

## 2022-04-12 PROCEDURE — 74011000258 HC RX REV CODE- 258: Performed by: INTERNAL MEDICINE

## 2022-04-12 PROCEDURE — 65610000006 HC RM INTENSIVE CARE

## 2022-04-12 PROCEDURE — 80076 HEPATIC FUNCTION PANEL: CPT

## 2022-04-12 PROCEDURE — 74011000250 HC RX REV CODE- 250: Performed by: FAMILY MEDICINE

## 2022-04-12 PROCEDURE — 74011000250 HC RX REV CODE- 250: Performed by: STUDENT IN AN ORGANIZED HEALTH CARE EDUCATION/TRAINING PROGRAM

## 2022-04-12 RX ADMIN — HEPARIN SODIUM 5000 UNITS: 5000 INJECTION INTRAVENOUS; SUBCUTANEOUS at 09:58

## 2022-04-12 RX ADMIN — HEPARIN SODIUM 5000 UNITS: 5000 INJECTION INTRAVENOUS; SUBCUTANEOUS at 18:00

## 2022-04-12 RX ADMIN — FENTANYL CITRATE 125 MCG/HR: 50 INJECTION, SOLUTION INTRAMUSCULAR; INTRAVENOUS at 09:00

## 2022-04-12 RX ADMIN — SODIUM CHLORIDE, PRESERVATIVE FREE 10 ML: 5 INJECTION INTRAVENOUS at 05:46

## 2022-04-12 RX ADMIN — SODIUM CHLORIDE 1 MCG/KG/HR: 9 INJECTION, SOLUTION INTRAVENOUS at 02:14

## 2022-04-12 RX ADMIN — PIPERACILLIN AND TAZOBACTAM 3.38 G: 3; .375 INJECTION, POWDER, LYOPHILIZED, FOR SOLUTION INTRAVENOUS at 20:53

## 2022-04-12 RX ADMIN — PIPERACILLIN AND TAZOBACTAM 3.38 G: 3; .375 INJECTION, POWDER, LYOPHILIZED, FOR SOLUTION INTRAVENOUS at 05:45

## 2022-04-12 RX ADMIN — SODIUM CHLORIDE, PRESERVATIVE FREE 10 ML: 5 INJECTION INTRAVENOUS at 20:52

## 2022-04-12 RX ADMIN — SODIUM CHLORIDE, PRESERVATIVE FREE 10 ML: 5 INJECTION INTRAVENOUS at 14:00

## 2022-04-12 RX ADMIN — PIPERACILLIN AND TAZOBACTAM 3.38 G: 3; .375 INJECTION, POWDER, LYOPHILIZED, FOR SOLUTION INTRAVENOUS at 14:00

## 2022-04-12 RX ADMIN — CHLORHEXIDINE GLUCONATE 10 ML: 1.2 RINSE ORAL at 09:58

## 2022-04-12 RX ADMIN — FENTANYL CITRATE 150 MCG/HR: 50 INJECTION, SOLUTION INTRAMUSCULAR; INTRAVENOUS at 02:37

## 2022-04-12 RX ADMIN — SODIUM CHLORIDE, PRESERVATIVE FREE 40 MG: 5 INJECTION INTRAVENOUS at 09:58

## 2022-04-12 RX ADMIN — HEPARIN SODIUM 5000 UNITS: 5000 INJECTION INTRAVENOUS; SUBCUTANEOUS at 02:19

## 2022-04-12 RX ADMIN — MIDAZOLAM 1 MG: 1 INJECTION INTRAMUSCULAR; INTRAVENOUS at 02:14

## 2022-04-12 NOTE — PROGRESS NOTES
Hospitalist Progress Note    Patient: Martinez Nurse MRN: [id number]  CSN: [id number]    YOB: 1970  Age: 46 y.o. Sex: male    DOA: 4/11/2022 LOS:  LOS: 1 day                Assessment/Plan     Patient Active Problem List   Diagnosis Code    Acute respiratory failure with hypoxemia ([facility] [address] J96.01    Hypercapnia R06.89    Drug overdose T50.901A    Cocaine use F14.90        Chief complaint :  Drug overdose  46 y.o. male who presents to the ED via EMS for Drug overdose OD;  Per EMS pt found with snoring respiration and unresponsive. Admitted to the ICU for acute hypoxemic respiratory failure due to drug overdose, drug overdose, cocaine use, NSTEMI. Awake, orally intubated, on SBT, follows commands. CRITICAL CARE PLAN    Resp -   Acute resp failure secondary drug overdose and not able to maintain airway. See vent orders, VAP bundle. HOB>30 degrees. ID -   No evidence of infection    [facility] - Monitor HD. Off levophed. Elevated troponin secondary to substance abuse and resp failure  No ACS per cardiology  Echo with EF of 50-55%  Stress test before discharge. Heme/onc - Follow H&H, plts. Renal - Trend BUN, Cr, follow I/O, gandhi in place. Check and replace Mg, K, phos. Endocrine -  Follow FSG    Neuro/ Pain/ Sedation -  Precedex, versed. GI - NPO for now. SLP eval post extubation. Prophylaxis - DVT: heparin, GI: protonix      Disposition : TBD      Physical Exam:  General: As above    HEENT: NC, Atraumatic. PERRLA, anicteric sclerae. Lungs: CTA Bilaterally. No Wheezing/Rhonchi/Rales.   Heart:  S1 S2,  No murmur, No Rubs, No Gallops  Abdomen: Soft, Non distended, Non tender.  +Bowel sounds,   Extremities: No c/c/e           Vital signs/Intake and Output:  Visit Vitals  /87   Pulse 85   Temp 98.3 °F (36.8 °C)   Resp 11   Ht 6' (1.829 m)   Wt 67.6 kg (149 lb)   SpO2 100%   BMI 20.21 kg/m²     Current Shift:  04/12 0701 - 04/12 1900  In: -   Out: 300 [Urine:300]  Last three shifts:  04/10 1901 - 04/12 0700  In: 3779.5 [I.V.:3779.5]  Out: 2400 [Urine:2400]            Labs: Results:       Chemistry Recent Labs     04/12/22 0450 04/11/22  0020   GLU 77 80    139   K 4.1 5.1    104   CO2 31 28   BUN 14 11   CREA 0.87 1.37*   CA 8.8 9.3   AGAP 3 7   BUCR 16 8*   AP 82  --    TP 6.5  --    ALB 2.6*  --    GLOB 3.9  --    AGRAT 0.7*  --       CBC w/Diff Recent Labs     04/12/22 0450 04/11/22  0020   WBC 13.2 25.9*   RBC 4.41 4.85   HGB 13.0 14.4   HCT 40.3 45.5    446*   GRANS 78* 83*   LYMPH 13* 6*   EOS 0 0      Cardiac Enzymes Recent Labs     04/11/22  0020         Coagulation No results for input(s): PTP, INR, APTT, INREXT, INREXT in the last 72 hours. Lipid Panel No results found for: CHOL, CHOLPOCT, CHOLX, CHLST, CHOLV, 916181, HDL, HDLP, LDL, LDLC, DLDLP, 694745, VLDLC, VLDL, TGLX, TRIGL, TRIGP, TGLPOCT, CHHD, CHHDX   BNP No results for input(s): BNPP in the last 72 hours.    Liver Enzymes Recent Labs     04/12/22 0450   TP 6.5   ALB 2.6*   AP 82      Thyroid Studies No results found for: T4, T3U, TSH, TSHEXT, TSHEXT     Procedures/imaging: see electronic medical records for all procedures/Xrays and details which were not copied into this note but were reviewed prior to creation of Plan

## 2022-04-12 NOTE — PROGRESS NOTES
Bedside shift change report received from EDWARD Roth RN. Report included the following information SBAR, Kardex, Intake/Output, MAR, Recent Results, Med Rec Status and Cardiac Rhythm NSR and plan of care. Pt on sedation, sedation stopped for sedation vacation. Pt family at bedside. Pt remained calm, drowsy, following commands. Able to remove restraints without patient becoming aggressive or pulling at lines or tubes. MD at bedside to see patient. Pt placed on SBT. See RT and MD note. Once patient fully awake, and SBT successful patient extubated per MD order. Pt with fsbs 65, not symptomatic, bedside swallow eval performed. Pt given orange juice. Patient alert and oriented to self and date. Cannot remember why he came in or which hospital he came into. Bed alarm active. Bedside shift change report given to Cleotis Boxer. Report included the following information SBAR, Kardex, Intake/Output, MAR, Recent Results, Med Rec Status and Cardiac Rhythm NSR and plan of care.

## 2022-04-12 NOTE — PROGRESS NOTES
conducted a follow up consultation for Tiffanie Bowen, who is a 46 y.o.,male. Patient is on vent and not responsive to me. It was reported to me that patient's son is on his way here from out of state. Patient's brother Trista Alexandra is a patient in 0 and another brother . Parents and other family members are aware he is here. Chaplains will continue to follow and will provide pastoral care as needed or requested.  recommends bedside caregivers page the  on duty if patient shows signs of acute spiritual or emotional distress. [address], [place].    Board Certified   [phone] - Office

## 2022-04-12 NOTE — PROGRESS NOTES
Cardiology Progress Note        Patient: Mila Pulido        Sex: male          DOA: 4/11/2022  YOB: 1970      Age:  46 y.o.        LOS:  LOS: 1 day   Assessment/Plan     Principal Problem:    Acute respiratory failure with hypoxemia ([address] (4/11/2022)    Active Problems:    Hypercapnia (4/11/2022)      Drug overdose (4/11/2022)      Cocaine use (4/11/2022)        Plan:    Patient is extubated  Elevated troponin without ACS  Substance abuse  Consider stress test before discharging  Discussed with patient and family                    Subjective:    cc:  Elevated troponin        REVIEW OF SYSTEMS:     General: No fevers or chills. Cardiovascular: No chest pain or pressure. No palpitations. No ankle swelling  Pulmonary: No SOB, orthopnea, PND  Gastrointestinal: No nausea, vomiting or diarrhea      Objective:      Visit Vitals  /87   Pulse 85   Temp (P) 98.5 °F (36.9 °C)   Resp 11   Ht 6' (1.829 m)   Wt 67.6 kg (149 lb)   SpO2 100%   BMI 20.21 kg/m²     Body mass index is 20.21 kg/m². Physical Exam:  General Appearance: Comfortable, not using accessory muscles of respiration. NECK: No JVD, no thyroidomeglay. LUNGS: Clear bilaterally. HEART: S1+S2 audible,    ABD: Non-tender, BS Audible    EXT: No edema, and no cysnosis. VASCULAR EXAM: Pulses are intact. PSYCHIATRIC EXAM: Mood is appropriate.     Medication:  Current Facility-Administered Medications   Medication Dose Route Frequency    lactated Ringers infusion  75 mL/hr IntraVENous CONTINUOUS    sodium chloride (NS) flush 5-40 mL  5-40 mL IntraVENous Q8H    sodium chloride (NS) flush 5-40 mL  5-40 mL IntraVENous PRN    acetaminophen (TYLENOL) tablet 650 mg  650 mg Oral Q6H PRN    Or    acetaminophen (TYLENOL) suppository 650 mg  650 mg Rectal Q6H PRN    polyethylene glycol (MIRALAX) packet 17 g  17 g Oral DAILY PRN    potassium chloride 10 mEq in 100 ml IVPB  10 mEq IntraVENous PRN    magnesium sulfate 2 g/50 ml IVPB (premix or compounded)  2 g IntraVENous PRN    pantoprazole (PROTONIX) 40 mg in 0.9% sodium chloride 10 mL injection  40 mg IntraVENous DAILY    ELECTROLYTE REPLACEMENT PROTOCOL - Potassium Standard Dosing   1 Each Other PRN    ELECTROLYTE REPLACEMENT PROTOCOL - Magnesium   1 Each Other PRN    ELECTROLYTE REPLACEMENT PROTOCOL - Phosphorus  Standard Dosing  1 Each Other PRN    ELECTROLYTE REPLACEMENT PROTOCOL - Calcium   1 Each Other PRN    heparin (porcine) injection 5,000 Units  5,000 Units SubCUTAneous Q8H    piperacillin-tazobactam (ZOSYN) 3.375 g in 0.9% sodium chloride (MBP/ADV) 100 mL MBP  3.375 g IntraVENous Q8H               Lab/Data Reviewed:  Procedures/imaging: see electronic medical records for all procedures/Xrays   and details which were not copied into this note but were reviewed prior to creation of Plan       All lab results for the last 24 hours reviewed. Recent Labs     04/12/22  0450 04/11/22  0020   WBC 13.2 25.9*   HGB 13.0 14.4   HCT 40.3 45.5    446*     Recent Labs     04/12/22  0450 04/11/22  0020    139   K 4.1 5.1    104   CO2 31 28   GLU 77 80   BUN 14 11   CREA 0.87 1.37*   CA 8.8 9.3       RADIOLOGY:  CT Results  (Last 48 hours)               04/11/22 0627  CT HEAD WO CONT Final result    Impression:      Unremarkable noncontrast head CT. Narrative:  EXAM: CT head without contrast 4/11/2022       CLINICAL INDICATION/HISTORY:  Altered mental status. COMPARISON: None. TECHNIQUE: Serial axial images of the head were performed without intravenous   contrast.   Sagittal and coronal reformations were obtained from source images. Automated   exposure control, mAs and/or kVp reductions based on patient size, and iterative   reconstruction. The specific techniques utilized on this CT exam have been   documented in the patient's electronic medical record.        Digital imaging and communications and medicine (DICOM) format image data are   available to nonaffiliated external healthcare facilities or entities on a   secure, media free, reciprocally searchable basis with patient authorization for   at least a 12 month period after this study. _______________       FINDINGS:       BRAIN: The sulci, folia, ventricles and basal cisterns are within normal limits   for the patient's age. There is no intracranial hemorrhage, mass effect, or   midline shift. There are no areas of abnormal parenchymal attenuation. EXTRA-AXIAL SPACES AND MENINGES: There are no abnormal extra-axial fluid   collections. CALVARIUM: Intact. OTHER: The visualized portions of the paranasal sinuses and mastoid air cells   are clear.       _______________               CXR Results  (Last 48 hours)               04/12/22 0657  XR CHEST PORT Final result    Impression:      Bilateral interstitial opacities, predominantly in the right upper lung. Narrative:  EXAM: XR CHEST PORT       CLINICAL INDICATION/HISTORY: Acute hypercapnic respiratory failure, ET tube   position   -Additional: None       COMPARISON: One day prior       TECHNIQUE: Portable frontal view of the chest       _______________       FINDINGS:       SUPPORT DEVICES: Endotracheal and enteric tubes unchanged. HEART AND MEDIASTINUM: Cardiomediastinal silhouette within normal limits. LUNGS AND PLEURAL SPACES: Bilateral interstitial opacities, predominantly in the   right upper lung. No large effusion or pneumothorax.       _______________           04/11/22 0559  XR CHEST PORT Final result    Impression:      No acute cardiopulmonary abnormality. Interval intubation and enteric tube placement.        Narrative:  EXAM: XR CHEST PORT       CLINICAL INDICATION/HISTORY: intubation   -Additional: None       COMPARISON: Earlier the same day       TECHNIQUE: Portable frontal view of the chest       _______________       FINDINGS:       SUPPORT DEVICES: Interval intubation and enteric tube placement. HEART AND MEDIASTINUM: Cardiomediastinal silhouette within normal limits. LUNGS AND PLEURAL SPACES: No dense consolidation, large effusion or   pneumothorax.       _______________           04/11/22 0055  XR CHEST PORT Final result    Impression:      Mild increase in pulmonary interstitial markings, potentially chronic, though   mild interstitial edema not excluded. Narrative:  CLINICAL HISTORY: Found unresponsive. COMPARISONS:  None. TECHNIQUE:  single frontal view of the chest       ------------------------------------------       FINDINGS:       Lungs:  No consolidation or pleural fluid. Mild increase in pulmonary   interstitial markings. Mediastinum: Unremarkable.        Bones: No evidence of fracture or suspicious bone lesion.           ------------------------------------------               Cardiology Procedures:   Results for orders placed or performed during the hospital encounter of 04/11/22   EKG, 12 LEAD, INITIAL   Result Value Ref Range    Ventricular Rate 84 BPM    Atrial Rate 84 BPM    P-R Interval 144 ms    QRS Duration 78 ms    Q-T Interval 418 ms    QTC Calculation (Bezet) 493 ms    Calculated P Axis 75 degrees    Calculated R Axis 38 degrees    Calculated T Axis 64 degrees    Diagnosis       Normal sinus rhythm  Prolonged QT  Abnormal ECG  No previous ECGs available        Echo Results  (Last 48 hours)    None       Cardiolite (Tc-99m Sestamibi) stress test    Signed By: Claudeen January, MD     April 12, 2022

## 2022-04-12 NOTE — PROGRESS NOTES
Comprehensive Nutrition Assessment    Type and Reason for Visit: Initial,Consult (Tube Feedings Order and Management)    Nutrition Recommendations/Plan: Recommend starting tube feeding per recommendations below to avoid prolong NPO status. Will place tube feeding order. 04/12/22 [employer]   Enteral Nutrition   Feeding Route Orogastric   EN Formula 2.0 Calorie  (TwoCal)   Schedule Continuous   Feeding Regimen Goal: TwoCal @ 35ml/hr + 2 prosource. Start @ 10ml/hr + 2 prosource. Increase by 10ml Q6 until goal. Monitor for tolerance. Additives/Modulars Protein  (2 prosource (+30g PRO, 120kcals))   Water Flushes 150ml Q4   Goal EN & Flush Order Provides TwoCal @ 35ml/hr + 2 prosource will provide:     1660kcals, 94g PRO, 168g CHO, 539ml free water + 900ml flushes (1439ml)     Nutrition Assessment:  Presented with drug overdose. Admitted for acute hypoxemic respiratory failure due to drug overdose, drug overdose, cocaine use, NSTEMI. Patient is vented in ICU. Malnutrition Assessment:  Malnutrition Status:  Insufficient data      Estimated Daily Nutrient Needs:  Energy (kcal): 1750; Weight Used for Energy Requirements: Current  Protein (g): ; Weight Used for Protein Requirements: Current (1.2-2g)  Fluid (ml/day): 1750; Method Used for Fluid Requirements: 1 ml/kcal    Nutrition Related Findings:  Labs and meds- protonix, lactated ringers. No BM noted. No weight hx per chart review. Wounds:    None       Current Nutrition Therapies:  DIET NPO    Anthropometric Measures:  · Height:  6' (182.9 cm)  · Current Body Wt:  67.6 kg (149 lb 0.5 oz)   · Admission Body Wt:  149 lb 4 oz    · Usual Body Wt:   (unknown)     · Ideal Body Wt:  178 lbs:  83.7 %   · BMI Category:  Normal weight (BMI 18.5-24. 9)       Nutrition Diagnosis:   · Inadequate oral intake related to acute injury/trauma,impaired respiratory function as evidenced by intubation    Nutrition Interventions:   Food and/or Nutrient Delivery: Start tube feeding  Nutrition Education and Counseling: No recommendations at this time  Coordination of Nutrition Care: Continue to monitor while inpatient    Goals:  Start tube feeding to meet nutritional needs within the next 2-3 day       Nutrition Monitoring and Evaluation:   Behavioral-Environmental Outcomes: None identified  Food/Nutrient Intake Outcomes: Diet advancement/tolerance,Enteral nutrition intake/tolerance  Physical Signs/Symptoms Outcomes: Biochemical data,Skin,Weight,GI status    Discharge Planning:     Too soon to determine     Electronically signed by Hao Swanson RD on 4/12/2022 at 10:40 AM

## 2022-04-12 NOTE — PROGRESS NOTES
Bedside and Verbal shift change report given to ILVE Funez RN (oncoming nurse) by Lucie Palmer RN (offgoing nurse). Report included the following information SBAR, Kardex, ED Summary, Intake/Output, MAR, Recent Results, Med Rec Status and Cardiac Rhythm NSR.     2000 Shift assessment completed. Ventilator in use, opens eyes to voice, gag reflex noted, spontaneous movement bilaterally, will continue to monitor. 0000 Reassessment completed. No change will continue to monitor. 0400 Reassessment completed. No change will continue to monitor.

## 2022-04-12 NOTE — PROGRESS NOTES
Patient tolerated SBT after became awake, following commands on sedation holiday  Family at bedside and he was able to interact with them by nodding head and moving extremities  Remained calm. Good gag and cough reflex. RSBi 10-20's on SBT  No ET secretions and minimal oral secretions.  Good cuff leak  Tolerating ventilator liberation to NC O2  Monitor with NPO post-extubation  May check nursing swallow eval after 6 hrs  May tx to floor if bed needed after at least 6 hrs in ICU post-extubation remains stable w/o any withdrawal symptoms, signs - staff, hospitalist, pt, family [sister, mother] aware    Aurora Henry MD

## 2022-04-12 NOTE — PROGRESS NOTES
[facility]ists  Pulmonary, Critical Care, and Sleep Medicine    Name: Risa Scanlon MRN: [id number]   : 1970 Hospital: [facility] [place]    Date: 2022  Room: [address] [place] [address] Note                                              Consult requesting physician: Dr. Adria Mathias  Reason for Consult: Acute hypercapnic respiratory failure    IMPRESSION:   · Acute hypercapnic hypoxic respiratory failure - J96.01, J96.02  · Leukocytosis - D72.829  · Hypotension - I95.9  · Encephalopathy - likely 2' to OD - G93.40  · Elevated troponin - R77.8  · QT prolongation - R94.31  · Drug OD - T50.901A  Patient Active Problem List   Diagnosis Code    NSTEMI (non-ST elevated myocardial infarction) ([facility] [address] I21.4    Acute respiratory failure with hypoxemia (HCC) J96.01    Hypercapnia R06.89    Drug overdose T50.901A    Cocaine use F14.90   · Code status: Full Code     RECOMMENDATIONS:     Respiratory: Mech. Ventilated patients- aim to keep peak plateau pressure less than or equal to 30cm H2O.  Titrate FiO2 for goal SPO2> 91%  CXR 22 - Bilateral interstitial opacities, predominantly in the right upper lung with ET, OG unchanged  ABG reviewed 22 - improved oxygenation, ventilation  Daily ABG and CXR while intubated  VAP prevention bundle and sedation bundle followed, head of the bed at 30' all times  Daily sedation holiday and assessment for weaning with SBT as tolerated - RN aware  Sputum culture per ET tube - no sample - spoke with RN, RT  Bronchodilators as needed, pulmonary hygiene care    CVS: Blood pressure stable  Actively titrated off of Levophed earlier this a.m. for goal SBP> 100mmHg  Serial Troponin improved  Received  mg rectal x 1 per staff  Cardiology following - possible ischemic w/up - timing deferred to cardiology  EKG for follow up to monitor QTc - spoke with RN  Avoid medication known to prolong QTc if possible  IVF: NS 75 mL/hr    ECHO 22    Left Ventricle: Left ventricle is dilated. Mildly increased wall thickness. Global hypokinesis present. Low normal left ventricular systolic function with a visually estimated EF of 50 - 55%. Grade I diastolic dysfunction.   Right Ventricle: Mildly reduced systolic function.   Aortic Valve: Tricuspid valve. Mildly thickened cusp. Mild annular calcification    ID: leukocytosis likely related to stress, OD - resolved   Sepsis bundle and protocol followed  Follow sputum culture if sample available  Antibiotics: zosyn empirically  Deescalate antibiotic when appropriate. Hematology/Oncology: Hgb is expected to drop with IV fluid  Monitor serial CBC  No bleeding anywhere    Renal: Monitor UO, renal function and lytes  CK normal 4/11/22    GI/: N.p.o. - start TF  PPI for GI prophylaxis    Endocrine: stable BS. Monitor for any hypoglycemia    Neurology: patient had several episodes of uncontrollable violent agitation; not redirectable 4/11/22 in ICU  Sedated: Fentanyl, Precedex, Versed; as needed Versed and fentanyl  CT head nil acute on admission    Psychiatry: Chronic history of polysubstance abuse per son    Toxicology: Abnormal UDS    Pain/Sedation: As above    Skin/Wound: As per nursing protocol    Electrolytes: Replace electrolytes per ICU electrolyte replacement protocol. IVF: NS 75 mL an hour    Prophylaxis: DVT Prophylaxis: SCD/heparin. GI Prophylaxis: PPI. Restraints: Wrist soft restraints for patient interfering with medical therapy/management and patient safety. Lines/Tubes: PIV  ETT: 4/11/2022. OGT: 4/11/2022. Roque: 4/11/2022 (Medically necessary for strict input/output monitoring in critically ill patient, will remove it when not needed. Roque bundle followed). PT/OT eval and treat, OOB -when more stable     Advance Directive/Palliative Care: Consult as per clinical course.     Will defer respective systems problem management to primary and other respective consultant and follow patient in ICU with primary and other medical team.  Further recommendations will be based on the patient's response to recommended treatment and results of the investigation ordered. Quality Care: PPI, DVT prophylaxis, HOB elevated, Infection control all reviewed and addressed. Care of plan d/w RN, RT, MDR, hospitalist team.  D/w patient's family-son 4/11/22 (answered all questions to satisfaction). He describes father as a long term drug user, unsuccessful rehab attempts and somewhat estrange to him. He is planning to come over in the next 24 to 48 hours to be seen patient at bedside    High complexity decision making was performed during the evaluation of this patient at high risk for decompensation with multiple organ involvement. Total critical care time spent rendering care exclusive of procedures/family discussion/coordination of care: 42 minutes. Subjective/History of Present Illness:   Patient is a 46 y.o. male with PMHx longstanding drug and alcohol abuse presented to [facility] ED via EMS after found unresponsive with snoring respirations. Patient remains on ventilator and unable to provide additional information. As per chart, bystander CPR by police was performed when EMS arrived with patient found to have a strong pulse and stopped CPR. He did not respond to Narcan in the field. Patient was transferred to ER with bag-valve-mask breathing. He did not respond to Narcan in the ER with brief period of awakening and lethargic. ABG initially showed hypercapnia. Follow-up ABG showed worsening hypercapnia and hypoxia requiring to intubate the patient and put on ventilator for airway protection. Labs in ER showed leukocytosis, mildly elevated creatinine, elevated troponin, abnormal UDS with cocaine and THC positive while high alcohol levels in blood. Chest x-ray no significant abnormality. CT head without any acute abnormality in ER.   Cardiology consulted and recommended monitoring troponin and checking echo, no heparin IV drip. Spoke with son. He lives in [place] and patient is estranged from son. Last they spoke/met during [place] when he was visiting [place]. Information is significantly limited        04/12/22:   Patient seen at bedside in ICU room #109  Patient required multiple sedatives to control violent agitations as patient was not directable  Developed hypotension requiring Levophed  Actively titrated off of vasopressor this a.m. for goal SBP> 100 mmHg  Sedation with Versed 5 mg/h, Precedex 1 mcg/kg/ht, Fentanyl 150 mcg/h  Afebrile. Remains on ventilator  Easy to start moving restlessly to touch in the bed  Does not follow commands  Fair urine output  [facility] was not contacted by staff on anything about patient overnight        I/O last 24 hrs: Intake/Output Summary (Last 24 hours) at 4/12/2022 0859  Last data filed at 4/12/2022 7083  Gross per 24 hour   Intake 3575.9 ml   Output 1200 ml   Net 2375.9 ml         Review of Systems:  ROS not obtained due to patient factor. No Known Allergies     No past medical history on file. No past surgical history on file. Social History     Tobacco Use    Smoking status: Not on file    Smokeless tobacco: Not on file   Substance Use Topics    Alcohol use: Not on file      No family history on file.      Prior to Admission medications    Not on File     Current Facility-Administered Medications   Medication Dose Route Frequency    fentaNYL (PF) 900 mcg/30 ml infusion soln  0-200 mcg/hr IntraVENous TITRATE    dexmedeTOMidine (PRECEDEX) 400 mcg in 0.9% sodium chloride 100 mL infusion  0.1-1.5 mcg/kg/hr IntraVENous TITRATE    lactated Ringers infusion  75 mL/hr IntraVENous CONTINUOUS    sodium chloride (NS) flush 5-40 mL  5-40 mL IntraVENous Q8H    pantoprazole (PROTONIX) 40 mg in 0.9% sodium chloride 10 mL injection  40 mg IntraVENous DAILY    chlorhexidine (PERIDEX) 0.12 % mouthwash 10 mL  10 mL Oral Q12H    heparin (porcine) injection 5,000 Units 5,000 Units SubCUTAneous Q8H    piperacillin-tazobactam (ZOSYN) 3.375 g in 0.9% sodium chloride (MBP/ADV) 100 mL MBP  3.375 g IntraVENous Q8H    NOREPINephrine (LEVOPHED) 8 mg in 5% dextrose 250mL (32 mcg/mL) infusion  0.5-16 mcg/min IntraVENous TITRATE    midazolam in normal saline (VERSED) 1 mg/mL infusion  0-10 mg/hr IntraVENous TITRATE         Objective:   Vital Signs:    Visit Vitals  /87   Pulse 88   Temp 97.4 °F (36.3 °C)   Resp 27   Ht 6' (1.829 m)   Wt 67.6 kg (149 lb)   SpO2 100%   BMI 20.21 kg/m²       O2 Device: Endotracheal tube      Temp (24hrs), Av.1 °F (36.7 °C), Min:97.4 °F (36.3 °C), Max:98.9 °F (37.2 °C)       Intake/Output:   Last shift:      No intake/output data recorded. Last 3 shifts: 04/10 1901 -  0700  In: 3779.5 [I.V.:3779.5]  Out: 2400 [Urine:2400]      Intake/Output Summary (Last 24 hours) at 2022 0859  Last data filed at 2022 0626  Gross per 24 hour   Intake 3575.9 ml   Output 1200 ml   Net 2375.9 ml       Last 3 Recorded Weights in this Encounter    22 0022 22 0633 22 1134   Weight: 67.7 kg (149 lb 4 oz) 66.2 kg (145 lb 15.1 oz) 67.6 kg (149 lb)         Ventilator Settings:  Mode Rate Tidal Volume Pressure FiO2 PEEP   Assist control   450 ml    30 % 5 cm H20     Peak airway pressure: 21 cm H2O    Plateau pressure:     Tidal volume:    Minute ventilation: 8.81 l/min     Recent Labs     22  0513 22  0847 22  0834   PHI 7.40 7.37 7.28*   PCO2I 47.7* 49.0* 51.9*   PO2I 96 187* 38*   HCO3I 29.5* 28.5* 24.4   FIO2I 35 50 50       Physical Exam:     General/Neurology: sedated, doesn't follow commands, easy to move restlessly in bed to touch, NAD, no involuntary movements. On ventilator. Exam limitation  Head:   Normocephalic, without obvious abnormality, atraumatic. Eye:   EOM intact. No scleral icterus, no pallor. Nose:   No sinus tenderness  Throat:  Visible lips, mucosa, and tongue normal. No oral thrush.   Neck:   Supple, symmetric. No lymphadenopathy. Trachea midline  Lung: Moderate air entry bilateral equal. BL scattered rhonchi. No wheezing. No stridors. No prolongded expiration. No accessory muscle use. Heart:   Regular rate & rhythm. S1 S2 present. No murmur. No JVD. Abdomen:  Soft. NT. ND. +BS. No masses. No guarding, rigidity or rebound  Extremities:  No pedal edema. No cyanosis. Pulses: 2+ and symmetric in DP. Capillary refill: normal  Lymphatic:  No cervical or supraclavicular palpable lymphadenopathy. Musculoskeletal: No joint swelling.  No tenderness  Skin:   Color, texture, turgor fair      Data:       Recent Results (from the past 24 hour(s))   ECHO ADULT COMPLETE    Collection Time: 04/11/22 10:15 AM   Result Value Ref Range    IVSd 1.2 (A) 0.6 - 1.0 cm    LVIDd 3.3 (A) 4.2 - 5.9 cm    LVIDs 2.8 cm    LVOT Diameter 2.0 cm    LVPWd 1.0 0.6 - 1.0 cm    EF BP 45 (A) 55 - 100 %    LV Ejection Fraction A2C 46 %    LV Ejection Fraction A4C 42 %    LV EDV A2C 94 mL    LV EDV A4C 83 mL    LV EDV BP 94 67 - 155 mL    LV ESV A2C 51 mL    LV ESV A4C 48 mL    LV ESV BP 52 22 - 58 mL    LVOT Peak Gradient 2 mmHg    LVOT Mean Gradient 1 mmHg    LVOT SV 35.5 ml    LVOT Peak Velocity 0.6 m/s    LVOT VTI 11.3 cm    LA Volume A/L 41 mL    LA Volume 2C 49 18 - 58 mL    LA Volume 4C 33 18 - 58 mL    LA Volume BP 40 18 - 58 mL    AV Area by Peak Velocity 2.8 cm2    AV Area by VTI 2.9 cm2    AV Peak Gradient 2 mmHg    AV Mean Gradient 1 mmHg    AV Peak Velocity 0.7 m/s    AV Mean Velocity 0.5 m/s    AV VTI 11.7 cm    MR Peak Gradient 0 mmHg    MR Peak Velocity 0.1 m/s    MV A Velocity 0.35 m/s    MV E Wave Deceleration Time 312.8 ms    MV E Velocity 0.36 m/s    LV E' Lateral Velocity 9 cm/s    LV E' Septal Velocity 6 cm/s    TAPSE 1.2 (A) 1.7 cm    Aortic Root 2.9 cm    Fractional Shortening 2D 15 28 - 44 %    LV ESV Index BP 28 mL/m2    LV EDV Index BP 50 mL/m2    LV ESV Index A4C 26 mL/m2    LV EDV Index A4C 44 mL/m2    LV ESV Index A2C 27 mL/m2    LV EDV Index A2C 50 mL/m2    LVIDd Index 1.76 cm/m2    LVIDs Index 1.49 cm/m2    LV RWT Ratio 0.61     LV Mass 2D 109.1 88 - 224 g    LV Mass 2D Index 58.0 49 - 115 g/m2    MV E/A 1.03     E/E' Ratio (Averaged) 5.00     E/E' Lateral 4.00     E/E' Septal 6.00     LA Volume Index BP 21 16 - 34 ml/m2    LA Volume Index A/L 22 16 - 34 mL/m2    LVOT Stroke Volume Index 18.9 mL/m2    LVOT Area 3.1 cm2    LA Volume Index 2C 26 16 - 34 mL/m2    LA Volume Index 4C 18 16 - 34 mL/m2    Ao Root Index 1.54 cm/m2    AV Velocity Ratio 0.86     LVOT:AV VTI Index 0.97     GIULIANA/BSA VTI 1.5 cm2/m2    GIULIANA/BSA Peak Velocity 1.5 cm2/m2   TROPONIN-HIGH SENSITIVITY    Collection Time: 04/11/22 11:57 AM   Result Value Ref Range    Troponin-High Sensitivity 115 (HH) 0 - 78 ng/L   GLUCOSE, POC    Collection Time: 04/11/22  1:29 PM   Result Value Ref Range    Glucose (POC) 89 70 - 110 mg/dL   GLUCOSE, POC    Collection Time: 04/11/22  5:45 PM   Result Value Ref Range    Glucose (POC) 102 70 - 110 mg/dL   TROPONIN-HIGH SENSITIVITY    Collection Time: 04/11/22 10:40 PM   Result Value Ref Range    Troponin-High Sensitivity 59 0 - 78 ng/L   CBC WITH AUTOMATED DIFF    Collection Time: 04/12/22  4:50 AM   Result Value Ref Range    WBC 13.2 4.6 - 13.2 K/uL    RBC 4.41 4.35 - 5.65 M/uL    HGB 13.0 13.0 - 16.0 g/dL    HCT 40.3 36.0 - 48.0 %    MCV 91.4 78.0 - 100.0 FL    MCH 29.5 24.0 - 34.0 PG    MCHC 32.3 31.0 - 37.0 g/dL    RDW 13.0 11.6 - 14.5 %    PLATELET 311 224 - 854 K/uL    MPV 8.8 (L) 9.2 - 11.8 FL    NRBC 0.0 0  WBC    ABSOLUTE NRBC 0.00 0.00 - 0.01 K/uL    NEUTROPHILS 78 (H) 40 - 73 %    LYMPHOCYTES 13 (L) 21 - 52 %    MONOCYTES 8 3 - 10 %    EOSINOPHILS 0 0 - 5 %    BASOPHILS 0 0 - 2 %    IMMATURE GRANULOCYTES 1 (H) 0.0 - 0.5 %    ABS. NEUTROPHILS 10.3 (H) 1.8 - 8.0 K/UL    ABS. LYMPHOCYTES 1.6 0.9 - 3.6 K/UL    ABS. MONOCYTES 1.0 0.05 - 1.2 K/UL    ABS. EOSINOPHILS 0.0 0.0 - 0.4 K/UL    ABS.  BASOPHILS 0.0 0.0 - 0.1 K/UL    ABS. IMM. GRANS. 0.1 (H) 0.00 - 0.04 K/UL    DF AUTOMATED     HEPATIC FUNCTION PANEL    Collection Time: 04/12/22  4:50 AM   Result Value Ref Range    Protein, total 6.5 6.4 - 8.2 g/dL    Albumin 2.6 (L) 3.4 - 5.0 g/dL    Globulin 3.9 2.0 - 4.0 g/dL    A-G Ratio 0.7 (L) 0.8 - 1.7      Bilirubin, total 0.5 0.2 - 1.0 MG/DL    Bilirubin, direct 0.1 0.0 - 0.2 MG/DL    Alk.  phosphatase 82 45 - 117 U/L    AST (SGOT) 95 (H) 10 - 38 U/L    ALT (SGPT) 40 16 - 61 U/L   METABOLIC PANEL, BASIC    Collection Time: 04/12/22  4:50 AM   Result Value Ref Range    Sodium 142 136 - 145 mmol/L    Potassium 4.1 3.5 - 5.5 mmol/L    Chloride 108 100 - 111 mmol/L    CO2 31 21 - 32 mmol/L    Anion gap 3 3.0 - 18 mmol/L    Glucose 77 74 - 99 mg/dL    BUN 14 7.0 - 18 MG/DL    Creatinine 0.87 0.6 - 1.3 MG/DL    BUN/Creatinine ratio 16 12 - 20      GFR est AA >60 >60 ml/min/1.73m2    GFR est non-AA >60 >60 ml/min/1.73m2    Calcium 8.8 8.5 - 10.1 MG/DL   MAGNESIUM    Collection Time: 04/12/22  4:50 AM   Result Value Ref Range    Magnesium 2.1 1.6 - 2.6 mg/dL   PHOSPHORUS    Collection Time: 04/12/22  4:50 AM   Result Value Ref Range    Phosphorus 2.5 2.5 - 4.9 MG/DL   CALCIUM, IONIZED    Collection Time: 04/12/22  4:50 AM   Result Value Ref Range    Ionized Calcium 1.21 1.12 - 1.32 MMOL/L   TROPONIN-HIGH SENSITIVITY    Collection Time: 04/12/22  4:50 AM   Result Value Ref Range    Troponin-High Sensitivity 42 0 - 78 ng/L   BLOOD GAS, ARTERIAL POC    Collection Time: 04/12/22  5:13 AM   Result Value Ref Range    Device: ADULT VENT      FIO2 (POC) 35 %    pH (POC) 7.40 7.35 - 7.45      pCO2 (POC) 47.7 (H) 35.0 - 45.0 MMHG    pO2 (POC) 96 80 - 100 MMHG    HCO3 (POC) 29.5 (H) 22 - 26 MMOL/L    sO2 (POC) 97.3 (H) 92 - 97 %    Base excess (POC) 3.8 mmol/L    Mode ASSIST CONTROL      Tidal volume 450 ml    Set Rate 20 bpm    PEEP/CPAP (POC) 5 cmH2O    Mean Airway Pressure 9.1 cmH2O    PIP (POC) 20      Allens test (POC) NOT APPLICABLE Inspiratory Time 0.85 sec    Total resp. rate 20      Site RIGHT RADIAL      Patient temp. 98.9      Specimen type (POC) ARTERIAL      Performed by Veronika Kumar          Chemistry Recent Labs     04/12/22 0450 04/11/22  0020   GLU 77 80    139   K 4.1 5.1    104   CO2 31 28   BUN 14 11   CREA 0.87 1.37*   CA 8.8 9.3   MG 2.1  --    PHOS 2.5  --    AGAP 3 7   BUCR 16 8*   AP 82  --    TP 6.5  --    ALB 2.6*  --    GLOB 3.9  --    AGRAT 0.7*  --         Lactic Acid No results found for: LAC  No results for input(s): LAC in the last 72 hours. Liver Enzymes Protein, total   Date Value Ref Range Status   04/12/2022 6.5 6.4 - 8.2 g/dL Final     Albumin   Date Value Ref Range Status   04/12/2022 2.6 (L) 3.4 - 5.0 g/dL Final     Globulin   Date Value Ref Range Status   04/12/2022 3.9 2.0 - 4.0 g/dL Final     A-G Ratio   Date Value Ref Range Status   04/12/2022 0.7 (L) 0.8 - 1.7   Final     Alk. phosphatase   Date Value Ref Range Status   04/12/2022 82 45 - 117 U/L Final     Recent Labs     04/12/22  0450   TP 6.5   ALB 2.6*   GLOB 3.9   AGRAT 0.7*   AP 82        CBC w/Diff Recent Labs     04/12/22  0450 04/11/22  0020   WBC 13.2 25.9*   RBC 4.41 4.85   HGB 13.0 14.4   HCT 40.3 45.5    446*   GRANS 78* 83*   LYMPH 13* 6*   EOS 0 0        Cardiac Enzymes No results found for: CPK, CK, CKMMB, CKMB, RCK3, CKMBT, CKNDX, CKND1, CARMITA, TROPT, TROIQ, LATRELL, TROPT, TNIPOC, BNP, BNPP     BNP No results found for: BNP, BNPP, XBNPT     Coagulation No results for input(s): PTP, INR, APTT, INREXT, INREXT in the last 72 hours.       Thyroid  No results found for: T4, T3U, TSH, TSHEXT, TSHEXT    No results found for: T4     Urinalysis Lab Results   Component Value Date/Time    Color YELLOW 04/11/2022 12:20 AM    Appearance CLOUDY 04/11/2022 12:20 AM    Specific gravity 1.013 04/11/2022 12:20 AM    pH (UA) 5.0 04/11/2022 12:20 AM    Protein 100 (A) 04/11/2022 12:20 AM    Glucose Negative 04/11/2022 12:20 AM    Ketone Negative 04/11/2022 12:20 AM    Bilirubin Negative 04/11/2022 12:20 AM    Urobilinogen 1.0 04/11/2022 12:20 AM    Nitrites Negative 04/11/2022 12:20 AM    Leukocyte Esterase Negative 04/11/2022 12:20 AM    Epithelial cells FEW 04/11/2022 12:20 AM    Bacteria 1+ (A) 04/11/2022 12:20 AM    WBC 11 to 20 04/11/2022 12:20 AM    RBC 4 to 0 04/11/2022 12:20 AM        Micro  No results for input(s): SDES, CULT in the last 72 hours. No results for input(s): CULT in the last 72 hours. Culture data during this hospitalization. All Micro Results     Procedure Component Value Units Date/Time    CULTURE, RESPIRATORY/SPUTUM/BRONCH Micki Gilbert [[id number]]     Order Status: Sent Specimen: Sputum,ET Suction              CT HEAD WO CONT    Result Date: 4/11/2022  EXAM: CT head without contrast 4/11/2022 CLINICAL INDICATION/HISTORY:  Altered mental status. COMPARISON: None. TECHNIQUE: Serial axial images of the head were performed without intravenous contrast. Sagittal and coronal reformations were obtained from source images. Automated exposure control, mAs and/or kVp reductions based on patient size, and iterative reconstruction. The specific techniques utilized on this CT exam have been documented in the patient's electronic medical record. Digital imaging and communications and medicine (DICOM) format image data are available to nonaffiliated external healthcare facilities or entities on a secure, media free, reciprocally searchable basis with patient authorization for at least a 12 month period after this study. _______________ FINDINGS: BRAIN: The sulci, folia, ventricles and basal cisterns are within normal limits for the patient's age. There is no intracranial hemorrhage, mass effect, or midline shift. There are no areas of abnormal parenchymal attenuation. EXTRA-AXIAL SPACES AND MENINGES: There are no abnormal extra-axial fluid collections. CALVARIUM: Intact.  OTHER: The visualized portions of the paranasal sinuses and mastoid air cells are clear. _______________     Unremarkable noncontrast head CT. Images report reviewed by me:  CT (Most Recent) (CT chest reviewed by me) Results from Hospital Encounter encounter on 04/11/22    CT HEAD WO CONT    Narrative  EXAM: CT head without contrast 4/11/2022    CLINICAL INDICATION/HISTORY:  Altered mental status. COMPARISON: None. TECHNIQUE: Serial axial images of the head were performed without intravenous  contrast.  Sagittal and coronal reformations were obtained from source images. Automated  exposure control, mAs and/or kVp reductions based on patient size, and iterative  reconstruction. The specific techniques utilized on this CT exam have been  documented in the patient's electronic medical record. Digital imaging and communications and medicine (DICOM) format image data are  available to nonaffiliated external healthcare facilities or entities on a  secure, media free, reciprocally searchable basis with patient authorization for  at least a 12 month period after this study. _______________    FINDINGS:    BRAIN: The sulci, folia, ventricles and basal cisterns are within normal limits  for the patient's age. There is no intracranial hemorrhage, mass effect, or  midline shift. There are no areas of abnormal parenchymal attenuation. EXTRA-AXIAL SPACES AND MENINGES: There are no abnormal extra-axial fluid  collections. CALVARIUM: Intact. OTHER: The visualized portions of the paranasal sinuses and mastoid air cells  are clear.    _______________    Impression  Unremarkable noncontrast head CT. CXR reviewed by me:  XR (Most Recent).  CXR  reviewed by me and compared with previous CXR Results from Hospital Encounter encounter on 04/11/22    XR CHEST PORT    Narrative  EXAM: XR CHEST PORT    CLINICAL INDICATION/HISTORY: Acute hypercapnic respiratory failure, ET tube  position  -Additional: None    COMPARISON: One day prior    TECHNIQUE: Portable frontal view of the chest    _______________    FINDINGS:    SUPPORT DEVICES: Endotracheal and enteric tubes unchanged. HEART AND MEDIASTINUM: Cardiomediastinal silhouette within normal limits. LUNGS AND PLEURAL SPACES: Bilateral interstitial opacities, predominantly in the  right upper lung. No large effusion or pneumothorax.    _______________    Impression  Bilateral interstitial opacities, predominantly in the right upper lung. ·Please note: Voice-recognition software may have been used to generate this report, which may have resulted in some phonetic-based errors in grammar and contents. Even though attempts were made to correct all the mistakes, some may have been missed, and remained in the body of the document.       Callie Ross MD  4/12/2022

## 2022-04-13 LAB
ALBUMIN SERPL-MCNC: 2.9 G/DL (ref 3.4–5)
ALBUMIN/GLOB SERPL: 0.7 {RATIO} (ref 0.8–1.7)
ALP SERPL-CCNC: 88 U/L (ref 45–117)
ALT SERPL-CCNC: 41 U/L (ref 16–61)
ANION GAP SERPL CALC-SCNC: 9 MMOL/L (ref 3–18)
AST SERPL-CCNC: 91 U/L (ref 10–38)
BASOPHILS # BLD: 0 K/UL (ref 0–0.1)
BASOPHILS NFR BLD: 0 % (ref 0–2)
BILIRUB DIRECT SERPL-MCNC: 0.1 MG/DL (ref 0–0.2)
BILIRUB SERPL-MCNC: 0.4 MG/DL (ref 0.2–1)
BUN SERPL-MCNC: 12 MG/DL (ref 7–18)
BUN/CREAT SERPL: 13 (ref 12–20)
CA-I SERPL-SCNC: 1.07 MMOL/L (ref 1.12–1.32)
CA-I SERPL-SCNC: 1.17 MMOL/L (ref 1.12–1.32)
CALCIUM SERPL-MCNC: 9 MG/DL (ref 8.5–10.1)
CHLORIDE SERPL-SCNC: 103 MMOL/L (ref 100–111)
CO2 SERPL-SCNC: 27 MMOL/L (ref 21–32)
CREAT SERPL-MCNC: 0.96 MG/DL (ref 0.6–1.3)
DIFFERENTIAL METHOD BLD: ABNORMAL
EOSINOPHIL # BLD: 0 K/UL (ref 0–0.4)
EOSINOPHIL NFR BLD: 0 % (ref 0–5)
ERYTHROCYTE [DISTWIDTH] IN BLOOD BY AUTOMATED COUNT: 12.9 % (ref 11.6–14.5)
GLOBULIN SER CALC-MCNC: 3.9 G/DL (ref 2–4)
GLUCOSE BLD STRIP.AUTO-MCNC: 117 MG/DL (ref 70–110)
GLUCOSE SERPL-MCNC: 134 MG/DL (ref 74–99)
HCT VFR BLD AUTO: 43.4 % (ref 36–48)
HGB BLD-MCNC: 13.8 G/DL (ref 13–16)
IMM GRANULOCYTES # BLD AUTO: 0.1 K/UL (ref 0–0.04)
IMM GRANULOCYTES NFR BLD AUTO: 1 % (ref 0–0.5)
LYMPHOCYTES # BLD: 1.5 K/UL (ref 0.9–3.6)
LYMPHOCYTES NFR BLD: 7 % (ref 21–52)
MAGNESIUM SERPL-MCNC: 2.1 MG/DL (ref 1.6–2.6)
MCH RBC QN AUTO: 29.7 PG (ref 24–34)
MCHC RBC AUTO-ENTMCNC: 31.8 G/DL (ref 31–37)
MCV RBC AUTO: 93.3 FL (ref 78–100)
MONOCYTES # BLD: 2.3 K/UL (ref 0.05–1.2)
MONOCYTES NFR BLD: 11 % (ref 3–10)
NEUTS SEG # BLD: 16.2 K/UL (ref 1.8–8)
NEUTS SEG NFR BLD: 81 % (ref 40–73)
NRBC # BLD: 0 K/UL (ref 0–0.01)
NRBC BLD-RTO: 0 PER 100 WBC
PHOSPHATE SERPL-MCNC: 3.5 MG/DL (ref 2.5–4.9)
PLATELET # BLD AUTO: 355 K/UL (ref 135–420)
PMV BLD AUTO: 9.6 FL (ref 9.2–11.8)
POTASSIUM SERPL-SCNC: 4.1 MMOL/L (ref 3.5–5.5)
PROT SERPL-MCNC: 6.8 G/DL (ref 6.4–8.2)
RBC # BLD AUTO: 4.65 M/UL (ref 4.35–5.65)
SODIUM SERPL-SCNC: 139 MMOL/L (ref 136–145)
WBC # BLD AUTO: 20.2 K/UL (ref 4.6–13.2)

## 2022-04-13 PROCEDURE — 82330 ASSAY OF CALCIUM: CPT

## 2022-04-13 PROCEDURE — 74011250636 HC RX REV CODE- 250/636: Performed by: INTERNAL MEDICINE

## 2022-04-13 PROCEDURE — 74011250637 HC RX REV CODE- 250/637: Performed by: FAMILY MEDICINE

## 2022-04-13 PROCEDURE — 65660000000 HC RM CCU STEPDOWN

## 2022-04-13 PROCEDURE — 74011000258 HC RX REV CODE- 258: Performed by: INTERNAL MEDICINE

## 2022-04-13 PROCEDURE — 83735 ASSAY OF MAGNESIUM: CPT

## 2022-04-13 PROCEDURE — 80048 BASIC METABOLIC PNL TOTAL CA: CPT

## 2022-04-13 PROCEDURE — 85025 COMPLETE CBC W/AUTO DIFF WBC: CPT

## 2022-04-13 PROCEDURE — 74011250637 HC RX REV CODE- 250/637: Performed by: INTERNAL MEDICINE

## 2022-04-13 PROCEDURE — C9113 INJ PANTOPRAZOLE SODIUM, VIA: HCPCS | Performed by: FAMILY MEDICINE

## 2022-04-13 PROCEDURE — 74011250636 HC RX REV CODE- 250/636: Performed by: FAMILY MEDICINE

## 2022-04-13 PROCEDURE — 74011000250 HC RX REV CODE- 250: Performed by: FAMILY MEDICINE

## 2022-04-13 PROCEDURE — 84100 ASSAY OF PHOSPHORUS: CPT

## 2022-04-13 PROCEDURE — 82962 GLUCOSE BLOOD TEST: CPT

## 2022-04-13 PROCEDURE — 93005 ELECTROCARDIOGRAM TRACING: CPT

## 2022-04-13 PROCEDURE — 80076 HEPATIC FUNCTION PANEL: CPT

## 2022-04-13 PROCEDURE — 36415 COLL VENOUS BLD VENIPUNCTURE: CPT

## 2022-04-13 RX ORDER — AMLODIPINE BESYLATE 5 MG/1
5 TABLET ORAL DAILY
Status: DISCONTINUED | OUTPATIENT
Start: 2022-04-13 | End: 2022-04-15 | Stop reason: HOSPADM

## 2022-04-13 RX ORDER — CALCIUM GLUCONATE 20 MG/ML
2 INJECTION, SOLUTION INTRAVENOUS ONCE
Status: COMPLETED | OUTPATIENT
Start: 2022-04-13 | End: 2022-04-13

## 2022-04-13 RX ADMIN — ACETAMINOPHEN 650 MG: 325 TABLET ORAL at 00:35

## 2022-04-13 RX ADMIN — HEPARIN SODIUM 5000 UNITS: 5000 INJECTION INTRAVENOUS; SUBCUTANEOUS at 09:30

## 2022-04-13 RX ADMIN — HEPARIN SODIUM 5000 UNITS: 5000 INJECTION INTRAVENOUS; SUBCUTANEOUS at 00:35

## 2022-04-13 RX ADMIN — SODIUM CHLORIDE, PRESERVATIVE FREE 40 MG: 5 INJECTION INTRAVENOUS at 09:30

## 2022-04-13 RX ADMIN — PIPERACILLIN AND TAZOBACTAM 3.38 G: 3; .375 INJECTION, POWDER, LYOPHILIZED, FOR SOLUTION INTRAVENOUS at 14:00

## 2022-04-13 RX ADMIN — SODIUM CHLORIDE, PRESERVATIVE FREE 10 ML: 5 INJECTION INTRAVENOUS at 14:00

## 2022-04-13 RX ADMIN — HEPARIN SODIUM 5000 UNITS: 5000 INJECTION INTRAVENOUS; SUBCUTANEOUS at 18:33

## 2022-04-13 RX ADMIN — CALCIUM GLUCONATE 2 G: 20 INJECTION, SOLUTION INTRAVENOUS at 04:22

## 2022-04-13 RX ADMIN — PIPERACILLIN AND TAZOBACTAM 3.38 G: 3; .375 INJECTION, POWDER, LYOPHILIZED, FOR SOLUTION INTRAVENOUS at 06:38

## 2022-04-13 RX ADMIN — ACETAMINOPHEN 650 MG: 325 TABLET ORAL at 18:33

## 2022-04-13 RX ADMIN — AMLODIPINE BESYLATE 5 MG: 5 TABLET ORAL at 10:44

## 2022-04-13 NOTE — PROGRESS NOTES
EKG 10:44 am showed improved QTc to 451 ms  No further EKG for follow up on QTc  Will sign off once patient is out of Tracy Bernardo MD

## 2022-04-13 NOTE — PROGRESS NOTES
[facility]ists  Pulmonary, Critical Care, and Sleep Medicine    Name: Denise Quintana MRN: [id number]   : 1970 Hospital: [facility] [place]    Date: 2022  Room: [address] [place] [address] Note                                              Consult requesting physician: Dr. Montes Current  Reason for Consult: Acute hypercapnic respiratory failure    IMPRESSION:   · Acute hypercapnic hypoxic respiratory failure - J96.01, J96.02  · Leukocytosis - D72.829  · Elevated troponin - R77.8  · QT prolongation - R94.31  · Hypotension, resolved - I95.9  · Encephalopathy, resolved - likely 2' to OD - G93.40  · Drug OD - T50.901A  Patient Active Problem List   Diagnosis Code    Acute respiratory failure with hypoxemia (HCC) J96.01    Hypercapnia R06.89    Drug overdose T50.901A    Cocaine use F14.90   · Code status: Prior     RECOMMENDATIONS:     Respiratory: patient was intubated for Acute hypercapnic hypoxic respiratory failure following drug OD  Successfully liberated from ventilator support on 2022  Compensated respiratory status; on room air. No acute issues.  Monitor for goal SPO2> 91%  CXR 22 - Bilateral interstitial opacities, predominantly in the right upper lung with ET, OG unchanged  ABG reviewed 22 - improved oxygenation, ventilation  Periodic chest x-ray per clinical course  Aspiration prevention bundle followed, head of the bed at 30' all times  Bronchodilators as needed, pulmonary hygiene care  Incentive spirometry educated    CVS: Hemodynamically stable  Midsternal nonradiating reproducible chest discomfort likely related to CPR patient received in the field  Serial Troponin improved  Received  mg rectal x 1 per staff  Cardiology following - possible ischemic w/up - timing deferred to cardiology  EKG for follow up to monitor QTc - spoke with RN again  Avoid medication known to prolong QTc if possible  Stop IVF    ECHO 22    Left Ventricle: Left ventricle is dilated. Mildly increased wall thickness. Global hypokinesis present. Low normal left ventricular systolic function with a visually estimated EF of 50 - 55%. Grade I diastolic dysfunction.   Right Ventricle: Mildly reduced systolic function.   Aortic Valve: Tricuspid valve. Mildly thickened cusp. Mild annular calcification    ID: leukocytosis fluctuating; no fever  Sepsis bundle and protocol followed  Chest x-ray finding treated as possible aspiration  Antibiotics: zosyn empirically  Deescalate antibiotic when appropriate. Hematology/Oncology: Hgb stable  Monitor serial CBC  No bleeding anywhere    Renal: Monitor UO, renal function and lytes  CK normal 4/11/22    GI/: Tolerating swallowing as per bedside nursing evaluation  On full liquid adult diet. Advance as tolerated  PPI for GI prophylaxis    Endocrine: stable BS. Monitor for any hypoglycemia    Neurology: Awake, alert, oriented x4. Nonfocal neurological exam  CT head nil acute on admission    Psychiatry: Chronic history of polysubstance abuse per son  Counseled the patient on risks, cessation of substance abuse, other. He remains undecided    Toxicology: Abnormal UDS    Pain/Sedation: As above    Skin/Wound: As per nursing protocol    Electrolytes: Replace electrolytes per ICU electrolyte replacement protocol. Prophylaxis: DVT Prophylaxis: SCD/heparin. GI Prophylaxis: PPI. Restraints: Wrist soft restraints for patient interfering with medical therapy/management and patient safety. Lines/Tubes: PIV  ETT: 4/11/2022-4/12/2022  OGT: 4/11/2022-4/12/2022. Roque: 4/11/2022 -removed today    PT/OT eval and treat, OOB as tolerated    Advance Directive/Palliative Care: Consult as per clinical course.   Full code    Will defer respective systems problem management to primary and other respective consultant and follow patient while in ICU with primary and other medical team.  Further recommendations will be based on the patient's response to recommended treatment and results of the investigation ordered. Quality Care: PPI, DVT prophylaxis, HOB elevated, Infection control all reviewed and addressed. Care of plan d/w RNDEREJE, hospitalist team, patient [answered all questions to his satisfaction]  D/w patient's family-son 4/11/22 (answered all questions to satisfaction). He describes father as a long term drug user, unsuccessful rehab attempts and somewhat estrange to him. He is planning to come over in the next 24 to 48 hours to be seen patient at bedside    High complexity decision making was performed during the evaluation of this patient at high risk for decompensation with multiple organ involvement. Total critical care time spent rendering care exclusive of procedures/family discussion/coordination of care: 36 minutes. Subjective/History of Present Illness:   Patient is a 46 y.o. male with PMHx longstanding drug and alcohol abuse presented to [facility] ED via EMS after found unresponsive with snoring respirations. Patient remains on ventilator and unable to provide additional information. As per chart, bystander CPR by police was performed when EMS arrived with patient found to have a strong pulse and stopped CPR. He did not respond to Narcan in the field. Patient was transferred to ER with bag-valve-mask breathing. He did not respond to Narcan in the ER with brief period of awakening and lethargic. ABG initially showed hypercapnia. Follow-up ABG showed worsening hypercapnia and hypoxia requiring to intubate the patient and put on ventilator for airway protection. Labs in ER showed leukocytosis, mildly elevated creatinine, elevated troponin, abnormal UDS with cocaine and THC positive while high alcohol levels in blood. Chest x-ray no significant abnormality. CT head without any acute abnormality in ER. Cardiology consulted and recommended monitoring troponin and checking echo, no heparin IV drip. Spoke with son.   He lives in [place] and patient is estranged from son. Last they spoke/met during [place] when he was visiting [place]. Information is significantly limited        04/13/22:   Patient seen at bedside in ICU room #109  Awake, alert, oriented x4 and following all the commands  No respiratory issues since ventilator liberation yesterday afternoon; on room air. SPO2 100% on monitor  Tolerated bedside nursing swallow evaluation  The patient denies fever, chills, cough, dyspnea, wheezing or hemoptysis. Reports midsternal nonradiating reproducible chest discomfort at the location of CPR  Hemodynamically stable; no arrhythmia  Patient denies any palpitations, syncope, orthopnea, edema or paroxysmal nocturnal dyspnea  Fair urine output  [facility] was not contacted by staff on anything about patient overnight. I/O last 24 hrs: Intake/Output Summary (Last 24 hours) at 4/13/2022 0816  Last data filed at 4/13/2022 0659  Gross per 24 hour   Intake 1946.95 ml   Output 1256 ml   Net 690.95 ml         Review of Systems:  ROS not obtained due to patient factor. No Known Allergies     No past medical history on file. No past surgical history on file. Social History     Tobacco Use    Smoking status: Not on file    Smokeless tobacco: Not on file   Substance Use Topics    Alcohol use: Not on file      No family history on file.      Prior to Admission medications    Not on File     Current Facility-Administered Medications   Medication Dose Route Frequency    lactated Ringers infusion  75 mL/hr IntraVENous CONTINUOUS    sodium chloride (NS) flush 5-40 mL  5-40 mL IntraVENous Q8H    pantoprazole (PROTONIX) 40 mg in 0.9% sodium chloride 10 mL injection  40 mg IntraVENous DAILY    heparin (porcine) injection 5,000 Units  5,000 Units SubCUTAneous Q8H    piperacillin-tazobactam (ZOSYN) 3.375 g in 0.9% sodium chloride (MBP/ADV) 100 mL MBP  3.375 g IntraVENous Q8H         Objective:   Vital Signs:    Visit Vitals  BP (!) 149/87 Pulse (!) 58   Temp 98.7 °F (37.1 °C)   Resp 18   Ht 6' (1.829 m)   Wt 67.6 kg (149 lb)   SpO2 100%   BMI 20.21 kg/m²       O2 Device: None (Room air)      Temp (24hrs), Av.4 °F (36.9 °C), Min:98 °F (36.7 °C), Max:98.7 °F (37.1 °C)       Intake/Output:   Last shift:      No intake/output data recorded.     Last 3 shifts:  1901 -  0700  In: 3514.9 [I.V.:3514.9]  Out: 2456 [Urine:2456]      Intake/Output Summary (Last 24 hours) at 2022 0816  Last data filed at 2022 0659  Gross per 24 hour   Intake 1946.95 ml   Output 1256 ml   Net 690.95 ml       Last 3 Recorded Weights in this Encounter    22 0022 22 0633 22 1134   Weight: 67.7 kg (149 lb 4 oz) 66.2 kg (145 lb 15.1 oz) 67.6 kg (149 lb)         Recent Labs     22  0513 22  0847 22  0834   PHI 7.40 7.37 7.28*   PCO2I 47.7* 49.0* 51.9*   PO2I 96 187* 38*   HCO3I 29.5* 28.5* 24.4   FIO2I 35 50 50       Physical Exam:     General: AAO x 4, in no respiratory distress, cooperative, appears older than stated age, on room air  HEENT: PERRL, throat normal without erythema or exudate  Neck: No abnormally enlarged lymph nodes or thyroid, supple  Chest: normal, reproducible tenderness in mid sternal area  Lungs: moderate air entry, clear to auscultation bilaterally, normal percussion bilaterally, no rash  Heart: Regular rate and rhythm, S1S2 present, or without murmur or extra heart sounds  Abdomen: Nondistended, bowel sounds normoactive, tympanic, abdomen is soft without significant tenderness, masses, organomegaly or guarding, rigidity, rebound  Extremity: no edema, no cyanosis; peripheral pulses 2+ and symmetric  Neuro: alert, oriented x 3, no defects noted in general exam.  Skin: Skin color, texture, turgor unchanged      Data:       Recent Results (from the past 24 hour(s))   GLUCOSE, POC    Collection Time: 22 11:50 AM   Result Value Ref Range    Glucose (POC) 78 70 - 110 mg/dL   GLUCOSE, POC    Collection Time: 04/12/22  5:07 PM   Result Value Ref Range    Glucose (POC) 65 (L) 70 - 110 mg/dL   GLUCOSE, POC    Collection Time: 04/12/22  5:09 PM   Result Value Ref Range    Glucose (POC) 66 (L) 70 - 110 mg/dL   GLUCOSE, POC    Collection Time: 04/12/22 11:36 PM   Result Value Ref Range    Glucose (POC) 171 (H) 70 - 110 mg/dL   CBC WITH AUTOMATED DIFF    Collection Time: 04/13/22  2:24 AM   Result Value Ref Range    WBC 20.2 (H) 4.6 - 13.2 K/uL    RBC 4.65 4.35 - 5.65 M/uL    HGB 13.8 13.0 - 16.0 g/dL    HCT 43.4 36.0 - 48.0 %    MCV 93.3 78.0 - 100.0 FL    MCH 29.7 24.0 - 34.0 PG    MCHC 31.8 31.0 - 37.0 g/dL    RDW 12.9 11.6 - 14.5 %    PLATELET 478 619 - 462 K/uL    MPV 9.6 9.2 - 11.8 FL    NRBC 0.0 0  WBC    ABSOLUTE NRBC 0.00 0.00 - 0.01 K/uL    NEUTROPHILS 81 (H) 40 - 73 %    LYMPHOCYTES 7 (L) 21 - 52 %    MONOCYTES 11 (H) 3 - 10 %    EOSINOPHILS 0 0 - 5 %    BASOPHILS 0 0 - 2 %    IMMATURE GRANULOCYTES 1 (H) 0.0 - 0.5 %    ABS. NEUTROPHILS 16.2 (H) 1.8 - 8.0 K/UL    ABS. LYMPHOCYTES 1.5 0.9 - 3.6 K/UL    ABS. MONOCYTES 2.3 (H) 0.05 - 1.2 K/UL    ABS. EOSINOPHILS 0.0 0.0 - 0.4 K/UL    ABS. BASOPHILS 0.0 0.0 - 0.1 K/UL    ABS. IMM. GRANS. 0.1 (H) 0.00 - 0.04 K/UL    DF AUTOMATED     HEPATIC FUNCTION PANEL    Collection Time: 04/13/22  2:24 AM   Result Value Ref Range    Protein, total 6.8 6.4 - 8.2 g/dL    Albumin 2.9 (L) 3.4 - 5.0 g/dL    Globulin 3.9 2.0 - 4.0 g/dL    A-G Ratio 0.7 (L) 0.8 - 1.7      Bilirubin, total 0.4 0.2 - 1.0 MG/DL    Bilirubin, direct 0.1 0.0 - 0.2 MG/DL    Alk.  phosphatase 88 45 - 117 U/L    AST (SGOT) 91 (H) 10 - 38 U/L    ALT (SGPT) 41 16 - 61 U/L   METABOLIC PANEL, BASIC    Collection Time: 04/13/22  2:24 AM   Result Value Ref Range    Sodium 139 136 - 145 mmol/L    Potassium 4.1 3.5 - 5.5 mmol/L    Chloride 103 100 - 111 mmol/L    CO2 27 21 - 32 mmol/L    Anion gap 9 3.0 - 18 mmol/L    Glucose 134 (H) 74 - 99 mg/dL    BUN 12 7.0 - 18 MG/DL    Creatinine 0.96 0.6 - 1.3 MG/DL BUN/Creatinine ratio 13 12 - 20      GFR est AA >60 >60 ml/min/1.73m2    GFR est non-AA >60 >60 ml/min/1.73m2    Calcium 9.0 8.5 - 10.1 MG/DL   MAGNESIUM    Collection Time: 04/13/22  2:24 AM   Result Value Ref Range    Magnesium 2.1 1.6 - 2.6 mg/dL   PHOSPHORUS    Collection Time: 04/13/22  2:24 AM   Result Value Ref Range    Phosphorus 3.5 2.5 - 4.9 MG/DL   CALCIUM, IONIZED    Collection Time: 04/13/22  2:24 AM   Result Value Ref Range    Ionized Calcium 1.07 (L) 1.12 - 1.32 MMOL/L         Chemistry Recent Labs     04/13/22 0224 04/12/22  0450 04/11/22  0020   * 77 80    142 139   K 4.1 4.1 5.1    108 104   CO2 27 31 28   BUN 12 14 11   CREA 0.96 0.87 1.37*   CA 9.0 8.8 9.3   MG 2.1 2.1  --    PHOS 3.5 2.5  --    AGAP 9 3 7   BUCR 13 16 8*   AP 88 82  --    TP 6.8 6.5  --    ALB 2.9* 2.6*  --    GLOB 3.9 3.9  --    AGRAT 0.7* 0.7*  --         Lactic Acid No results found for: LAC  No results for input(s): LAC in the last 72 hours. Liver Enzymes Protein, total   Date Value Ref Range Status   04/13/2022 6.8 6.4 - 8.2 g/dL Final     Albumin   Date Value Ref Range Status   04/13/2022 2.9 (L) 3.4 - 5.0 g/dL Final     Globulin   Date Value Ref Range Status   04/13/2022 3.9 2.0 - 4.0 g/dL Final     A-G Ratio   Date Value Ref Range Status   04/13/2022 0.7 (L) 0.8 - 1.7   Final     Alk.  phosphatase   Date Value Ref Range Status   04/13/2022 88 45 - 117 U/L Final     Recent Labs     04/13/22 0224 04/12/22  0450   TP 6.8 6.5   ALB 2.9* 2.6*   GLOB 3.9 3.9   AGRAT 0.7* 0.7*   AP 88 82        CBC w/Diff Recent Labs     04/13/22  0224 04/12/22  0450 04/11/22  0020   WBC 20.2* 13.2 25.9*   RBC 4.65 4.41 4.85   HGB 13.8 13.0 14.4   HCT 43.4 40.3 45.5    364 446*   GRANS 81* 78* 83*   LYMPH 7* 13* 6*   EOS 0 0 0        Cardiac Enzymes No results found for: CPK, CK, CKMMB, CKMB, RCK3, CKMBT, CKNDX, CKND1, CARMITA, TROPT, TROIQ, LATRELL, TROPT, TNIPOC, BNP, BNPP     BNP No results found for: BNP, BNPP, XBNPT Coagulation No results for input(s): PTP, INR, APTT, INREXT, INREXT in the last 72 hours. Thyroid  No results found for: T4, T3U, TSH, TSHEXT, TSHEXT    No results found for: T4     Urinalysis Lab Results   Component Value Date/Time    Color YELLOW 04/11/2022 12:20 AM    Appearance CLOUDY 04/11/2022 12:20 AM    Specific gravity 1.013 04/11/2022 12:20 AM    pH (UA) 5.0 04/11/2022 12:20 AM    Protein 100 (A) 04/11/2022 12:20 AM    Glucose Negative 04/11/2022 12:20 AM    Ketone Negative 04/11/2022 12:20 AM    Bilirubin Negative 04/11/2022 12:20 AM    Urobilinogen 1.0 04/11/2022 12:20 AM    Nitrites Negative 04/11/2022 12:20 AM    Leukocyte Esterase Negative 04/11/2022 12:20 AM    Epithelial cells FEW 04/11/2022 12:20 AM    Bacteria 1+ (A) 04/11/2022 12:20 AM    WBC 11 to 20 04/11/2022 12:20 AM    RBC 4 to 0 04/11/2022 12:20 AM        Micro  No results for input(s): SDES, CULT in the last 72 hours. No results for input(s): CULT in the last 72 hours. Culture data during this hospitalization. All Micro Results     Procedure Component Value Units Date/Time    CULTURE, RESPIRATORY/SPUTUM/BRONCH Zachariah Bishop [[id number]]     Order Status: Sent Specimen: Sputum,ET Suction              CT HEAD WO CONT    Result Date: 4/11/2022  EXAM: CT head without contrast 4/11/2022 CLINICAL INDICATION/HISTORY:  Altered mental status. COMPARISON: None. TECHNIQUE: Serial axial images of the head were performed without intravenous contrast. Sagittal and coronal reformations were obtained from source images. Automated exposure control, mAs and/or kVp reductions based on patient size, and iterative reconstruction. The specific techniques utilized on this CT exam have been documented in the patient's electronic medical record.  Digital imaging and communications and medicine (DICOM) format image data are available to nonaffiliated external healthcare facilities or entities on a secure, media free, reciprocally searchable basis with patient authorization for at least a 12 month period after this study. _______________ FINDINGS: BRAIN: The sulci, folia, ventricles and basal cisterns are within normal limits for the patient's age. There is no intracranial hemorrhage, mass effect, or midline shift. There are no areas of abnormal parenchymal attenuation. EXTRA-AXIAL SPACES AND MENINGES: There are no abnormal extra-axial fluid collections. CALVARIUM: Intact. OTHER: The visualized portions of the paranasal sinuses and mastoid air cells are clear. _______________     Unremarkable noncontrast head CT. Images report reviewed by me:  CT (Most Recent) (CT chest reviewed by me) Results from Hospital Encounter encounter on 04/11/22    CT HEAD WO CONT    Narrative  EXAM: CT head without contrast 4/11/2022    CLINICAL INDICATION/HISTORY:  Altered mental status. COMPARISON: None. TECHNIQUE: Serial axial images of the head were performed without intravenous  contrast.  Sagittal and coronal reformations were obtained from source images. Automated  exposure control, mAs and/or kVp reductions based on patient size, and iterative  reconstruction. The specific techniques utilized on this CT exam have been  documented in the patient's electronic medical record. Digital imaging and communications and medicine (DICOM) format image data are  available to nonaffiliated external healthcare facilities or entities on a  secure, media free, reciprocally searchable basis with patient authorization for  at least a 12 month period after this study. _______________    FINDINGS:    BRAIN: The sulci, folia, ventricles and basal cisterns are within normal limits  for the patient's age. There is no intracranial hemorrhage, mass effect, or  midline shift. There are no areas of abnormal parenchymal attenuation. EXTRA-AXIAL SPACES AND MENINGES: There are no abnormal extra-axial fluid  collections. CALVARIUM: Intact.     OTHER: The visualized portions of the paranasal sinuses and mastoid air cells  are clear.    _______________    Impression  Unremarkable noncontrast head CT. CXR reviewed by me:  XR (Most Recent). CXR  reviewed by me and compared with previous CXR Results from Hospital Encounter encounter on 04/11/22    XR CHEST PORT    Narrative  EXAM: XR CHEST PORT    CLINICAL INDICATION/HISTORY: Acute hypercapnic respiratory failure, ET tube  position  -Additional: None    COMPARISON: One day prior    TECHNIQUE: Portable frontal view of the chest    _______________    FINDINGS:    SUPPORT DEVICES: Endotracheal and enteric tubes unchanged. HEART AND MEDIASTINUM: Cardiomediastinal silhouette within normal limits. LUNGS AND PLEURAL SPACES: Bilateral interstitial opacities, predominantly in the  right upper lung. No large effusion or pneumothorax.    _______________    Impression  Bilateral interstitial opacities, predominantly in the right upper lung. ·Please note: Voice-recognition software may have been used to generate this report, which may have resulted in some phonetic-based errors in grammar and contents. Even though attempts were made to correct all the mistakes, some may have been missed, and remained in the body of the document.       Perico Stearns MD  4/13/2022

## 2022-04-13 NOTE — PROGRESS NOTES
CM notes patient successfully extubated yesterday. CM met with patient who stated that he lives with his mother Ling Pope and that he did not use DME to ambulate prior to admission. CM notes plan for stress test tomorrow. Please encourage ambulation when appropriate to assist In identifying any discharge planning needs.      Care Management Interventions  Transition of Care Consult (CM Consult): Discharge Planning  Support Systems: Parent(s),Other Family Member(s)  Confirm Follow Up Transport: Family  Discharge Location  Patient Expects to be Discharged to[de-identified]  (discharge home with physician follow up versus home with [place] versus rehab)

## 2022-04-13 NOTE — PROGRESS NOTES
Hospitalist Progress Note    Patient: Franny Travis MRN: [id number]  CSN: [id number]    YOB: 1970  Age: 46 y.o. Sex: male    DOA: 4/11/2022 LOS:  LOS: 2 days                Assessment/Plan     Patient Active Problem List   Diagnosis Code    Acute respiratory failure with hypoxemia ([facility] [address] J96.01    Hypercapnia R06.89    Drug overdose T50.901A    Cocaine use F14.90        Chief complaint :  Drug overdose  46 y.o. male who presents to the ED via EMS for Drug overdose OD;  Per EMS pt found with snoring respiration and unresponsive. Admitted to the ICU for acute hypoxemic respiratory failure due to drug overdose, drug overdose, cocaine use, NSTEMI. Awake, orally intubated, on SBT, follows commands. CRITICAL CARE PLAN    Resp -   Acute resp failure secondary drug overdose and not able to maintain airway. Extubated 04/12/2022. ID -   No evidence of infection    CVS - Monitor HD. Off levophed. Elevated troponin secondary to substance abuse and resp failure  No ACS per cardiology  Echo with EF of 50-55%  Stress test before discharge. Heme/onc - Follow H&H, plts. Renal - Trend BUN, Cr, follow I/O, gandhi in place. Check and replace Mg, K, phos. Endocrine -  Follow FSG    Neuro/ Pain/ Sedation -  Precedex, versed. GI - full liquid. Prophylaxis - DVT: heparin, GI: protonix    Transfer to floor      Disposition : TBD    Review of systems  General: No fevers or chills. Cardiovascular:  No palpitations. Pulmonary: No shortness of breath. Gastrointestinal: No nausea, vomiting. Physical Exam:  General: As above    HEENT: NC, Atraumatic. PERRLA, anicteric sclerae. Lungs: CTA Bilaterally. No Wheezing/Rhonchi/Rales.   Heart:  S1 S2,  No murmur, No Rubs, No Gallops  Abdomen: Soft, Non distended, Non tender.  +Bowel sounds,   Extremities: No c/c/e           Vital signs/Intake and Output:  Visit Vitals  /72   Pulse 70   Temp 98.8 °F (37.1 °C)   Resp 24   Ht 6' (1.829 m)   Wt 67.6 kg (149 lb)   SpO2 100%   BMI 20.21 kg/m²     Current Shift:  04/13 0701 - 04/13 1900  In: -   Out: 1500 [Urine:1500]  Last three shifts:  04/11 1901 - 04/13 0700  In: 3514.9 [I.V.:3514.9]  Out: 2456 [Urine:2456]            Labs: Results:       Chemistry Recent Labs     04/13/22 0224 04/12/22 0450 04/11/22  0020   * 77 80    142 139   K 4.1 4.1 5.1    108 104   CO2 27 31 28   BUN 12 14 11   CREA 0.96 0.87 1.37*   CA 9.0 8.8 9.3   AGAP 9 3 7   BUCR 13 16 8*   AP 88 82  --    TP 6.8 6.5  --    ALB 2.9* 2.6*  --    GLOB 3.9 3.9  --    AGRAT 0.7* 0.7*  --       CBC w/Diff Recent Labs     04/13/22 0224 04/12/22 0450 04/11/22  0020   WBC 20.2* 13.2 25.9*   RBC 4.65 4.41 4.85   HGB 13.8 13.0 14.4   HCT 43.4 40.3 45.5    364 446*   GRANS 81* 78* 83*   LYMPH 7* 13* 6*   EOS 0 0 0      Cardiac Enzymes Recent Labs     04/11/22  0020         Coagulation No results for input(s): PTP, INR, APTT, INREXT, INREXT in the last 72 hours. Lipid Panel No results found for: CHOL, CHOLPOCT, CHOLX, CHLST, CHOLV, 586075, HDL, HDLP, LDL, LDLC, DLDLP, 380854, VLDLC, VLDL, TGLX, TRIGL, TRIGP, TGLPOCT, CHHD, CHHDX   BNP No results for input(s): BNPP in the last 72 hours.    Liver Enzymes Recent Labs     04/13/22 0224   TP 6.8   ALB 2.9*   AP 88      Thyroid Studies No results found for: T4, T3U, TSH, TSHEXT, TSHEXT     Procedures/imaging: see electronic medical records for all procedures/Xrays and details which were not copied into this note but were reviewed prior to creation of Plan

## 2022-04-13 NOTE — PROGRESS NOTES
Cardiology Progress Note        Patient: Flori Sutherland        Sex: male          DOA: 4/11/2022  YOB: 1970      Age:  46 y.o.        LOS:  LOS: 2 days   Assessment/Plan     Principal Problem:    Acute respiratory failure with hypoxemia ([address] (4/11/2022)    Active Problems:    Hypercapnia (4/11/2022)      Drug overdose (4/11/2022)      Cocaine use (4/11/2022)        Plan:  Discussed with patient and family  I will schedule stress test tomorrow    Patient is extubated  Elevated troponin without ACS  Substance abuse  Consider stress test before discharging  Discussed with patient and family                    Subjective:    cc:  Elevated troponin        REVIEW OF SYSTEMS:     General: No fevers or chills. Cardiovascular: No chest pain or pressure. No palpitations. No ankle swelling  Pulmonary: No SOB, orthopnea, PND  Gastrointestinal: No nausea, vomiting or diarrhea      Objective:      Visit Vitals  BP (!) 148/90   Pulse 67   Temp 98.8 °F (37.1 °C)   Resp 19   Ht 6' (1.829 m)   Wt 67.6 kg (149 lb)   SpO2 100%   BMI 20.21 kg/m²     Body mass index is 20.21 kg/m². Physical Exam:  General Appearance: Comfortable, not using accessory muscles of respiration. NECK: No JVD, no thyroidomeglay. LUNGS: Clear bilaterally. HEART: S1+S2 audible,    ABD: Non-tender, BS Audible    EXT: No edema, and no cysnosis. VASCULAR EXAM: Pulses are intact. PSYCHIATRIC EXAM: Mood is appropriate.     Medication:  Current Facility-Administered Medications   Medication Dose Route Frequency    amLODIPine (NORVASC) tablet 5 mg  5 mg Oral DAILY    sodium chloride (NS) flush 5-40 mL  5-40 mL IntraVENous Q8H    sodium chloride (NS) flush 5-40 mL  5-40 mL IntraVENous PRN    acetaminophen (TYLENOL) tablet 650 mg  650 mg Oral Q6H PRN    Or    acetaminophen (TYLENOL) suppository 650 mg  650 mg Rectal Q6H PRN    polyethylene glycol (MIRALAX) packet 17 g  17 g Oral DAILY PRN    potassium chloride 10 mEq in 100 ml IVPB  10 mEq IntraVENous PRN    magnesium sulfate 2 g/50 ml IVPB (premix or compounded)  2 g IntraVENous PRN    pantoprazole (PROTONIX) 40 mg in 0.9% sodium chloride 10 mL injection  40 mg IntraVENous DAILY    ELECTROLYTE REPLACEMENT PROTOCOL - Potassium Standard Dosing   1 Each Other PRN    ELECTROLYTE REPLACEMENT PROTOCOL - Magnesium   1 Each Other PRN    ELECTROLYTE REPLACEMENT PROTOCOL - Phosphorus  Standard Dosing  1 Each Other PRN    ELECTROLYTE REPLACEMENT PROTOCOL - Calcium   1 Each Other PRN    heparin (porcine) injection 5,000 Units  5,000 Units SubCUTAneous Q8H    piperacillin-tazobactam (ZOSYN) 3.375 g in 0.9% sodium chloride (MBP/ADV) 100 mL MBP  3.375 g IntraVENous Q8H               Lab/Data Reviewed:  Procedures/imaging: see electronic medical records for all procedures/Xrays   and details which were not copied into this note but were reviewed prior to creation of Plan       All lab results for the last 24 hours reviewed. Recent Labs     04/13/22  0224 04/12/22  0450 04/11/22  0020   WBC 20.2* 13.2 25.9*   HGB 13.8 13.0 14.4   HCT 43.4 40.3 45.5    364 446*     Recent Labs     04/13/22  0224 04/12/22  0450 04/11/22  0020    142 139   K 4.1 4.1 5.1    108 104   CO2 27 31 28   * 77 80   BUN 12 14 11   CREA 0.96 0.87 1.37*   CA 9.0 8.8 9.3       RADIOLOGY:  CT Results  (Last 48 hours)    None        CXR Results  (Last 48 hours)               04/12/22 0657  XR CHEST PORT Final result    Impression:      Bilateral interstitial opacities, predominantly in the right upper lung. Narrative:  EXAM: XR CHEST PORT       CLINICAL INDICATION/HISTORY: Acute hypercapnic respiratory failure, ET tube   position   -Additional: None       COMPARISON: One day prior       TECHNIQUE: Portable frontal view of the chest       _______________       FINDINGS:       SUPPORT DEVICES: Endotracheal and enteric tubes unchanged.        HEART AND MEDIASTINUM: Cardiomediastinal silhouette within normal limits. LUNGS AND PLEURAL SPACES: Bilateral interstitial opacities, predominantly in the   right upper lung.  No large effusion or pneumothorax.       _______________                   Cardiology Procedures:   Results for orders placed or performed during the hospital encounter of 04/11/22   EKG, 12 LEAD, INITIAL   Result Value Ref Range    Ventricular Rate 84 BPM    Atrial Rate 84 BPM    P-R Interval 144 ms    QRS Duration 78 ms    Q-T Interval 418 ms    QTC Calculation (Bezet) 493 ms    Calculated P Axis 75 degrees    Calculated R Axis 38 degrees    Calculated T Axis 64 degrees    Diagnosis       Normal sinus rhythm  Prolonged QT  Abnormal ECG  No previous ECGs available        Echo Results  (Last 48 hours)    None       Cardiolite (Tc-99m Sestamibi) stress test    Signed By: Heather Jessica MD     April 13, 2022

## 2022-04-13 NOTE — PROGRESS NOTES
Bedside shift change report received from [facility], [facility].. Report included the following information SBAR, Kardex, Intake/Output, MAR, Recent Results, Med Rec Status and Cardiac Rhythm NSR and plan of care. Pt confused, with periods of short term memory loss. Calm and cooperative. TRANSFER - OUT REPORT:    Verbal report given to RN on Tiffanie Bowen  being transferred to [facility] for routine progression of care       Report consisted of patients Situation, Background, Assessment and   Recommendations(SBAR). Information from the following report(s) SBAR, Kardex, [facility] and Cardiac Rhythm NSR was reviewed with the receiving nurse. Lines:   Peripheral IV 04/11/22 Left (Active)   Site Assessment Clean, dry, & intact 04/13/22 1200   Phlebitis Assessment 0 04/13/22 1200   Infiltration Assessment 0 04/13/22 1200   Dressing Status Clean, dry, & intact 04/13/22 1200   Dressing Type Transparent;Tape 04/13/22 1200   Hub Color/Line Status Flushed;Capped 04/13/22 1200   Action Taken Open ports on tubing capped 04/13/22 1200   Alcohol Cap Used Yes 04/13/22 1200       Peripheral IV 04/11/22 Anterior;Right Forearm (Active)   Site Assessment Clean, dry, & intact 04/13/22 1200   Phlebitis Assessment 0 04/13/22 1200   Infiltration Assessment 0 04/13/22 1200   Dressing Status Clean, dry, & intact 04/13/22 1200   Dressing Type Transparent;Tape 04/13/22 1200   Hub Color/Line Status Flushed;Capped 04/13/22 1200   Action Taken Open ports on tubing capped 04/13/22 1200   Alcohol Cap Used Yes 04/13/22 1200       Peripheral IV 04/11/22 Right;Distal Cephalic (Active)   Site Assessment Clean, dry, & intact 04/13/22 1200   Phlebitis Assessment 0 04/13/22 1200   Infiltration Assessment 0 04/13/22 1200   Dressing Status Clean, dry, & intact 04/13/22 1200   Dressing Type Disk with Chlorhexadine gluconate (CHG); Transparent 04/13/22 1200   Hub Color/Line Status Pink; Infusing 04/13/22 1200   Action Taken Open ports on tubing capped 04/13/22 1200   Alcohol Cap Used Yes 04/13/22 1200        Opportunity for questions and clarification was provided.       Patient transported with:   Monitor  Registered Nurse

## 2022-04-14 ENCOUNTER — APPOINTMENT (OUTPATIENT)
Dept: NON INVASIVE DIAGNOSTICS | Age: 52
DRG: 917 | End: 2022-04-14
Attending: INTERNAL MEDICINE

## 2022-04-14 ENCOUNTER — APPOINTMENT (OUTPATIENT)
Dept: NUCLEAR MEDICINE | Age: 52
DRG: 917 | End: 2022-04-14
Attending: INTERNAL MEDICINE

## 2022-04-14 LAB
ALBUMIN SERPL-MCNC: 2.8 G/DL (ref 3.4–5)
ALBUMIN/GLOB SERPL: 0.7 {RATIO} (ref 0.8–1.7)
ALP SERPL-CCNC: 76 U/L (ref 45–117)
ALT SERPL-CCNC: 39 U/L (ref 16–61)
ANION GAP SERPL CALC-SCNC: 6 MMOL/L (ref 3–18)
AST SERPL-CCNC: 74 U/L (ref 10–38)
ATRIAL RATE: 57 BPM
ATRIAL RATE: 84 BPM
BASOPHILS # BLD: 0 K/UL (ref 0–0.1)
BASOPHILS NFR BLD: 0 % (ref 0–2)
BILIRUB DIRECT SERPL-MCNC: 0.2 MG/DL (ref 0–0.2)
BILIRUB SERPL-MCNC: 0.5 MG/DL (ref 0.2–1)
BUN SERPL-MCNC: 5 MG/DL (ref 7–18)
BUN/CREAT SERPL: 8 (ref 12–20)
CA-I SERPL-SCNC: 1.18 MMOL/L (ref 1.12–1.32)
CALCIUM SERPL-MCNC: 8.8 MG/DL (ref 8.5–10.1)
CALCULATED P AXIS, ECG09: 67 DEGREES
CALCULATED P AXIS, ECG09: 75 DEGREES
CALCULATED R AXIS, ECG10: 38 DEGREES
CALCULATED R AXIS, ECG10: 38 DEGREES
CALCULATED T AXIS, ECG11: 12 DEGREES
CALCULATED T AXIS, ECG11: 64 DEGREES
CHLORIDE SERPL-SCNC: 105 MMOL/L (ref 100–111)
CO2 SERPL-SCNC: 30 MMOL/L (ref 21–32)
CREAT SERPL-MCNC: 0.65 MG/DL (ref 0.6–1.3)
DIAGNOSIS, 93000: NORMAL
DIAGNOSIS, 93000: NORMAL
DIFFERENTIAL METHOD BLD: ABNORMAL
EOSINOPHIL # BLD: 0 K/UL (ref 0–0.4)
EOSINOPHIL NFR BLD: 0 % (ref 0–5)
ERYTHROCYTE [DISTWIDTH] IN BLOOD BY AUTOMATED COUNT: 12.6 % (ref 11.6–14.5)
GLOBULIN SER CALC-MCNC: 3.8 G/DL (ref 2–4)
GLUCOSE SERPL-MCNC: 87 MG/DL (ref 74–99)
HCT VFR BLD AUTO: 40 % (ref 36–48)
HGB BLD-MCNC: 13.2 G/DL (ref 13–16)
IMM GRANULOCYTES # BLD AUTO: 0 K/UL (ref 0–0.04)
IMM GRANULOCYTES NFR BLD AUTO: 0 % (ref 0–0.5)
LYMPHOCYTES # BLD: 1.8 K/UL (ref 0.9–3.6)
LYMPHOCYTES NFR BLD: 18 % (ref 21–52)
MAGNESIUM SERPL-MCNC: 2 MG/DL (ref 1.6–2.6)
MCH RBC QN AUTO: 29.6 PG (ref 24–34)
MCHC RBC AUTO-ENTMCNC: 33 G/DL (ref 31–37)
MCV RBC AUTO: 89.7 FL (ref 78–100)
MONOCYTES # BLD: 1.2 K/UL (ref 0.05–1.2)
MONOCYTES NFR BLD: 12 % (ref 3–10)
NEUTS SEG # BLD: 6.9 K/UL (ref 1.8–8)
NEUTS SEG NFR BLD: 69 % (ref 40–73)
NRBC # BLD: 0 K/UL (ref 0–0.01)
NRBC BLD-RTO: 0 PER 100 WBC
NUC STRESS EJECTION FRACTION: 52 %
P-R INTERVAL, ECG05: 130 MS
P-R INTERVAL, ECG05: 144 MS
PHOSPHATE SERPL-MCNC: 3.6 MG/DL (ref 2.5–4.9)
PLATELET # BLD AUTO: 397 K/UL (ref 135–420)
PMV BLD AUTO: 9.7 FL (ref 9.2–11.8)
POTASSIUM SERPL-SCNC: 3.8 MMOL/L (ref 3.5–5.5)
PROT SERPL-MCNC: 6.6 G/DL (ref 6.4–8.2)
Q-T INTERVAL, ECG07: 418 MS
Q-T INTERVAL, ECG07: 464 MS
QRS DURATION, ECG06: 78 MS
QRS DURATION, ECG06: 88 MS
QTC CALCULATION (BEZET), ECG08: 451 MS
QTC CALCULATION (BEZET), ECG08: 493 MS
RBC # BLD AUTO: 4.46 M/UL (ref 4.35–5.65)
SODIUM SERPL-SCNC: 141 MMOL/L (ref 136–145)
STRESS BASELINE HR: 60 BPM
STRESS ESTIMATED WORKLOAD: 1 METS
STRESS EXERCISE DUR MIN: 1 MIN
STRESS EXERCISE DUR SEC: 1 SEC
STRESS PEAK DIAS BP: 87 MMHG
STRESS PEAK SYS BP: 168 MMHG
STRESS PERCENT HR ACHIEVED: 62 %
STRESS POST PEAK HR: 104 BPM
STRESS RATE PRESSURE PRODUCT: NORMAL BPM*MMHG
STRESS TARGET HR: 169 BPM
TID: 1.2
VENTRICULAR RATE, ECG03: 57 BPM
VENTRICULAR RATE, ECG03: 84 BPM
WBC # BLD AUTO: 9.9 K/UL (ref 4.6–13.2)

## 2022-04-14 PROCEDURE — 93017 CV STRESS TEST TRACING ONLY: CPT

## 2022-04-14 PROCEDURE — 84100 ASSAY OF PHOSPHORUS: CPT

## 2022-04-14 PROCEDURE — 85025 COMPLETE CBC W/AUTO DIFF WBC: CPT

## 2022-04-14 PROCEDURE — 74011250637 HC RX REV CODE- 250/637: Performed by: INTERNAL MEDICINE

## 2022-04-14 PROCEDURE — 83735 ASSAY OF MAGNESIUM: CPT

## 2022-04-14 PROCEDURE — 80076 HEPATIC FUNCTION PANEL: CPT

## 2022-04-14 PROCEDURE — 65660000000 HC RM CCU STEPDOWN

## 2022-04-14 PROCEDURE — 97116 GAIT TRAINING THERAPY: CPT

## 2022-04-14 PROCEDURE — A9500 TC99M SESTAMIBI: HCPCS

## 2022-04-14 PROCEDURE — 80048 BASIC METABOLIC PNL TOTAL CA: CPT

## 2022-04-14 PROCEDURE — 74011250636 HC RX REV CODE- 250/636

## 2022-04-14 PROCEDURE — 97162 PT EVAL MOD COMPLEX 30 MIN: CPT

## 2022-04-14 PROCEDURE — 74011250636 HC RX REV CODE- 250/636: Performed by: INTERNAL MEDICINE

## 2022-04-14 PROCEDURE — 74011000258 HC RX REV CODE- 258: Performed by: INTERNAL MEDICINE

## 2022-04-14 PROCEDURE — 36415 COLL VENOUS BLD VENIPUNCTURE: CPT

## 2022-04-14 PROCEDURE — 74011000250 HC RX REV CODE- 250: Performed by: FAMILY MEDICINE

## 2022-04-14 PROCEDURE — 82330 ASSAY OF CALCIUM: CPT

## 2022-04-14 PROCEDURE — 74011250636 HC RX REV CODE- 250/636: Performed by: FAMILY MEDICINE

## 2022-04-14 PROCEDURE — C9113 INJ PANTOPRAZOLE SODIUM, VIA: HCPCS | Performed by: FAMILY MEDICINE

## 2022-04-14 RX ORDER — GUAIFENESIN 100 MG/5ML
200 SOLUTION ORAL
Status: DISCONTINUED | OUTPATIENT
Start: 2022-04-14 | End: 2022-04-15 | Stop reason: HOSPADM

## 2022-04-14 RX ORDER — TETRAKIS(2-METHOXYISOBUTYLISOCYANIDE)COPPER(I) TETRAFLUOROBORATE 1 MG/ML
33.9 INJECTION, POWDER, LYOPHILIZED, FOR SOLUTION INTRAVENOUS
Status: COMPLETED | OUTPATIENT
Start: 2022-04-14 | End: 2022-04-14

## 2022-04-14 RX ADMIN — HEPARIN SODIUM 5000 UNITS: 5000 INJECTION INTRAVENOUS; SUBCUTANEOUS at 18:00

## 2022-04-14 RX ADMIN — SODIUM CHLORIDE, PRESERVATIVE FREE 10 ML: 5 INJECTION INTRAVENOUS at 21:51

## 2022-04-14 RX ADMIN — SODIUM CHLORIDE, PRESERVATIVE FREE 40 MG: 5 INJECTION INTRAVENOUS at 12:28

## 2022-04-14 RX ADMIN — PIPERACILLIN AND TAZOBACTAM 3.38 G: 3; .375 INJECTION, POWDER, LYOPHILIZED, FOR SOLUTION INTRAVENOUS at 01:34

## 2022-04-14 RX ADMIN — PIPERACILLIN AND TAZOBACTAM 3.38 G: 3; .375 INJECTION, POWDER, LYOPHILIZED, FOR SOLUTION INTRAVENOUS at 06:21

## 2022-04-14 RX ADMIN — HEPARIN SODIUM 5000 UNITS: 5000 INJECTION INTRAVENOUS; SUBCUTANEOUS at 01:55

## 2022-04-14 RX ADMIN — HEPARIN SODIUM 5000 UNITS: 5000 INJECTION INTRAVENOUS; SUBCUTANEOUS at 12:27

## 2022-04-14 RX ADMIN — SODIUM CHLORIDE, PRESERVATIVE FREE 10 ML: 5 INJECTION INTRAVENOUS at 01:34

## 2022-04-14 RX ADMIN — SODIUM CHLORIDE, PRESERVATIVE FREE 10 ML: 5 INJECTION INTRAVENOUS at 06:21

## 2022-04-14 RX ADMIN — PIPERACILLIN AND TAZOBACTAM 3.38 G: 3; .375 INJECTION, POWDER, LYOPHILIZED, FOR SOLUTION INTRAVENOUS at 21:51

## 2022-04-14 RX ADMIN — TETRAKIS(2-METHOXYISOBUTYLISOCYANIDE)COPPER(I) TETRAFLUOROBORATE 33.9 MILLICURIE: 1 INJECTION, POWDER, LYOPHILIZED, FOR SOLUTION INTRAVENOUS at 11:28

## 2022-04-14 RX ADMIN — REGADENOSON 0.4 MG: 0.08 INJECTION, SOLUTION INTRAVENOUS at 11:28

## 2022-04-14 RX ADMIN — SODIUM CHLORIDE, PRESERVATIVE FREE 10 ML: 5 INJECTION INTRAVENOUS at 14:00

## 2022-04-14 RX ADMIN — PIPERACILLIN AND TAZOBACTAM 3.38 G: 3; .375 INJECTION, POWDER, LYOPHILIZED, FOR SOLUTION INTRAVENOUS at 13:58

## 2022-04-14 RX ADMIN — AMLODIPINE BESYLATE 5 MG: 5 TABLET ORAL at 12:28

## 2022-04-14 NOTE — PROGRESS NOTES
Occupational Therapy Evaluation Attempt     OT eval orders received. Chart reviewed. Attempted Occupational Therapy Evaluation, however, patient unable to be seen due to:  []  Nausea/vomiting  [x]  Eating  []  Pain  []  Patient too lethargic  []  Off Unit for testing/procedure  []  Dialysis treatment in progress  []  Telemetry Results  []  Other:      Will f/u later as patient's schedule allows.   Darrian Robledo, OTR/L

## 2022-04-14 NOTE — PROGRESS NOTES
Pharmacy - Zosyn Dosing for Floor Patient    Changed Zosyn to 3.375 gm IV q6hrs x 15 more doses ( 7 days total )  per protocol. CrCl >100 ml/min    Marlon Saez.  Batsheva Araya [facility]

## 2022-04-14 NOTE — PROGRESS NOTES
Problem: Mobility Impaired (Adult and Pediatric)  Goal: *Acute Goals and Plan of Care (Insert Text)  Description: Physical Therapy Goals   Initiated 4/14/2022 and to be accomplished within 5-7 day(s)  1. Patient will move from supine <> sit with S/mod I in prep for out of bed activity and change of position. 2.  Patient will perform sit<> stand with S/mod I/LRAD in prep for transfers/ambulation. 3.  Patient will transfer from bed <> chair with S/mod I/LRAD for time up in chair for completion of ADL activity. 4.  Patient will ambulate 150 feet with S/Jovany/LRAD for improved functional mobility at discharge. 5.  Patient will ascend/descend flight of stairs with handrail(s) with CGA for home re-entry as needed. Outcome: Progressing Towards Goal    PHYSICAL THERAPY EVALUATION    Patient: Debbie Collins (13 y.o. male)  Date: 4/14/2022  Primary Diagnosis: NSTEMI (non-ST elevated myocardial infarction) ([facility] [I21.4]  Precautions:   Fall  PLOF: independent amb w/o AD    ASSESSMENT :  Based on the objective data described below, the patient is seen on telemetry unit following admission for above dx- see H&P for details. Pt presents long sitting in bed and pt girlfriend present in room. Pt alert and O x 3, follows commands and reports chest pain when coughing (ant sternum due to CPR in the field). Pt educated in splinting  with pillow with gentle cough for pain management. Pt presents with intact AROM and decr'd motor performance BLE's, decr'd dynamic standing balance and decreased activity tolerance, all impacting independence in overall functional mobility independence. Pt demonstrates transition to sit EOB withsupervision, transfers to stand with S/SBA and able to complete GT/RW in beckett CGA. Intermittent path deviations present. Fair tolerance for therapeutic ex standing as noted below. Pt left back in room, seated EOB family present. All needs in reach.   Pt educated in PT POC and agreeable to same.   Recommend HHPT for follow up physical therapy upon discharge to reach maximal level of independence/safety with functional mobility. Patient will benefit from skilled intervention to address the above impairments. Pt Education: Role of physical therapy in acute care setting, fall prevention and safety/technique during functional mobility tasks    Patient's rehabilitation potential is considered to be Good  Factors which may influence rehabilitation potential include:   []         None noted  []         Mental ability/status  [x]         Medical condition  []         Home/family situation and support systems  []         Safety awareness  []         Pain tolerance/management  []         Other:      PLAN :  Recommendations and Planned Interventions:   [x]           Bed Mobility Training             [x]    Neuromuscular Re-Education  [x]           Transfer Training                   []    Orthotic/Prosthetic Training  [x]           Gait Training                          []    Modalities  [x]           Therapeutic Exercises           []    Edema Management/Control  [x]           Therapeutic Activities            []    Family Training/Education  [x]           Patient Education  []           Other (comment):    Frequency/Duration: Patient will be followed by physical therapy 1-2 times per day/4-7 days per week to address goals. Discharge Recommendations: Home Physical Therapy  Further Equipment Recommendations for Discharge: TBD     SUBJECTIVE:   Patient stated My chest hurts when I cough.     OBJECTIVE DATA SUMMARY:   No past medical history on file. No past surgical history on file.   Barriers to Learning/Limitations: yes;  altered mental status (i.e.Sedation, Confusion)  Compensate with: Visual Cues, Verbal Cues, and Tactile Cues  Home Situation:  Home Situation  Home Environment: Apartment  # Steps to Enter: 10  Rails to Enter: Yes  Hand Rails : Left  One/Two Story Residence: One story  Living Alone: No  Support Systems: Spouse/Significant Other  Patient Expects to be Discharged to[de-identified] Unable to determine at this time  Current DME Used/Available at Home: None  Tub or Shower Type: Tub/Shower combination  Critical Behavior:  Neurologic State: Alert  Orientation Level: Oriented to person;Oriented to place;Oriented to situation  Cognition: Follows commands  Safety/Judgement: Awareness of environment  Psychosocial  Patient Behaviors: Calm; Cooperative  Family  Behaviors: Calm;Supportive  Needs Expressed: Educational;Emotional;Social/Environmental  Purposeful Interaction: Yes  Pt Identified Daily Priority: Clinical issues (comment); Communication issues (comment)  Caritas Process: Nurture loving kindness;Establish trust;Nurture spiritual self;Teaching/learning; Attend basic human needs;Create healing environment  Caring Interventions: Reassure; Therapeutic modalities  Reassure: Therapeutic listening; Informing; Acceptance;Caring rounds;Quiet presence  Therapeutic Modalities: Intentional therapeutic touch  Skin Condition/Temp: Dry;Warm  Family  Behaviors: Calm;Supportive  Skin Integrity: Scars (comment); Tattoos (comment)  Skin Integumentary  Skin Color: Appropriate for ethnicity  Skin Condition/Temp: Dry;Warm  Skin Integrity: Scars (comment); Tattoos (comment)  Turgor: Non-tenting  Strength:    Strength: Generally decreased, functional-grossly 4-/5   Tone & Sensation:   Sensation: Intact  Range Of Motion:  AROM: Within functional limits  Functional Mobility:  Bed Mobility:  Rolling: Supervision  Supine to Sit: Supervision  Scooting: Supervision  Transfers:  Sit to Stand: Supervision;Stand-by assistance  Stand to Sit: Supervision;Stand-by assistance  Balance:   Sitting: Intact  Standing: Impaired; With support  Standing - Static: Good  Standing - Dynamic : Fair (with RW)  Ambulation/Gait Training:  Distance (ft): 100 Feet (ft)  Assistive Device: Gait belt;Walker, rolling  Ambulation - Level of Assistance: Contact guard assistance  Gait Description (WDL): Exceptions to WDL  Gait Abnormalities: Decreased step clearance; Path deviations  Base of Support: Narrowed  Interventions: Safety awareness training;Verbal cues  Therapeutic Exercises:   Standing balance/strengthening LE's: HR's 2x10, Hip abduction x 10  Pain:  Pain level pre-treatment: 5/10   Pain level post-treatment: 5/10   Pain Intervention(s) : Medication (see MAR); Rest, Ice, Repositioning  Response to intervention: Nurse notified, See doc flow  Activity Tolerance:   Fair   Please refer to the flowsheet for vital signs taken during this treatment. After treatment:   [x]         Patient left in no apparent distress sitting up EOB  []         Patient left in no apparent distress in bed  [x]         Call bell left within reach  []         Nursing notified  [x]         Caregiver present-Girlfriend and visitors  []         Bed alarm activated  []         SCDs applied    COMMUNICATION/EDUCATION:   [x]         Role of Physical Therapy in the acute care setting. [x]         Fall prevention education was provided and the patient/caregiver indicated understanding. [x]         Patient/family have participated as able in goal setting and plan of care. [x]         Patient/family agree to work toward stated goals and plan of care. []         Patient understands intent and goals of therapy, but is neutral about his/her participation. []         Patient is unable to participate in goal setting/plan of care: ongoing with therapy staff.  []         Other:     Thank you for this referral.  Inessa Davis, PT   Time Calculation: 23 mins      Eval Complexity: History: LOW Complexity : Zero comorbidities / personal factors that will impact the outcome / POCExam:LOW Complexity : 1-2 Standardized tests and measures addressing body structure, function, activity limitation and / or participation in recreation  Presentation: MEDIUM Complexity : Evolving with changing characteristics  Clinical Decision Making:Medium Complexity    Overall Complexity:MEDIUM

## 2022-04-14 NOTE — PROGRESS NOTES
Physician Progress Note      PATIENT:               Fab Charles  CSN #:                  [id number]  :                       1970  ADMIT DATE:       2022 12:05 AM  DISCH DATE:  RESPONDING  PROVIDER #:        Ramírez Fish MD        QUERY TEXT:    Type of Encephalopathy: Please provide further specificity, if known. Clinical indicators include: encephalopathy, sepsis, infection, alcohol abuse, alcohol, alcohol use, altered mental status, intracranial hemorrhage, hypoglycemia, fever  sepsis, fever  Options provided:  -- Anoxic/hypoxic encephalopathy  -- Metabolic encephalopathy  -- Toxic encephalopathy  -- Hepatic encephalopathy  -- Hypertensive encephalopathy  -- Other - I will add my own diagnosis  -- Disagree - Not applicable / Not valid  -- Disagree - Clinically Unable to determine / Unknown        PROVIDER RESPONSE TEXT:    Encephalopathy secondary to drug overdose          QUERY TEXT:    Pt admitted with  respiratory failure,  Drug overdose . Pt noted to have Hypotension ,  86/64, 88/58, 88/59,  88/65, 85/60.  possible, please document in the progress notes and discharge summary if you are evaluating and/or treating any of the following: The medical record reflects the following:  Risk Factors: resp failure, Drug overdose NSTEMI? Clinical Indicators: ? Nstemi, Hypootension  86/64, 88/58, 88/59,  88/65, 85/60. ,  Treatment: levophed IV, IVF's,    Thank You  Jayy Amaya RN CDI CRCR  Options provided:  -- Cardiogenic Shock  -- Neurogenic Shock  -- Septic Shock  -- Hypovolemic Shock  -- Hypovolemia without Shock  -- Hypotension without Shock  -- Other - I will add my own diagnosis  -- Disagree - Not applicable / Not valid  -- Disagree - Clinically unable to determine / Unknown  -- Refer to Clinical Documentation Reviewer    PROVIDER RESPONSE TEXT:    Provider is clinically unable to determine a response to this query.     Query created by: Esther Ferrari on 2022 2:25 PM      Electronically signed by:  Diana Chaparro MD 4/14/2022 2:45 PM

## 2022-04-14 NOTE — PROGRESS NOTES
Comprehensive Nutrition Assessment    Type and Reason for Visit: Reassess    Nutrition Recommendations/Plan: Continue with oral diet. Monitor PO intake and weight. Monitor need for ONS. Nutrition Assessment:  Admitted for acute hypoxemic respiratory failure due to drug overdose, drug overdose, cocaine use, NSTEMI. Patient was extubated 4/12/22. Transfered to [address] [place] [address] 4/13. Noted stress test today 4/14. Malnutrition Assessment:  Malnutrition Status:  Insufficient data      Estimated Daily Nutrient Needs:  Energy (kcal): 4001-5796; Weight Used for Energy Requirements: Current  Protein (g): 68; Weight Used for Protein Requirements: Current (1g)  Fluid (ml/day): 3551-4911; Method Used for Fluid Requirements: 1 ml/kcal    Nutrition Related Findings:  Lab and med: protonix. No BM noted (x3 days). OG tube removed. Full liquid diet started 4/13 and advanced to regular today (4/14). No PO documented at this time. Attempted to speak with pt however was not in room. Attempt to contact pt by phone as well but unavailable. Will re-attempt to speak with pt at a better time. Wounds:    None       Current Nutrition Therapies:  ADULT DIET Regular    Anthropometric Measures:  · Height:  6' (182.9 cm)  · Current Body Wt:  67.6 kg (149 lb 0.5 oz)   · Admission Body Wt:  149 lb 4 oz    · Usual Body Wt:   (unknown)     · Ideal Body Wt:  178 lbs:  83.7 %    · BMI Category:  Normal weight (BMI 18.5-24. 9)       Nutrition Diagnosis:   No nutrition diagnosis at this time     Nutrition Interventions:   Food and/or Nutrient Delivery: Continue current diet  Nutrition Education and Counseling: No recommendations at this time  Coordination of Nutrition Care: Continue to monitor while inpatient    Goals:  PO >75% of needs throughout the next 3-5 days       Nutrition Monitoring and Evaluation:   Behavioral-Environmental Outcomes: None identified  Food/Nutrient Intake Outcomes: Diet advancement/tolerance,Food and nutrient intake  Physical Signs/Symptoms Outcomes: Biochemical data,Meal time behavior,Skin,Weight    Discharge Planning:    Continue current diet     Electronically signed by Jose Ross RD on 4/14/2022 at 9:57 AM

## 2022-04-14 NOTE — PROGRESS NOTES
4/14/2022 PT note: consult received and chart reviewed. Evaluation attempted. Pt family/? present in room with prayer in progress. Will f/u at later time as pt schedule allows for PT evaluation.  Thank you for this referral.   Chris Slater, PT

## 2022-04-14 NOTE — PROGRESS NOTES
1407: Patient back in room, visitors included patient's father, stepmother and sister; patient unable to state what his home address is (previously listed as [facility] in [place]) and family members needed to call to verify address; corrected via admissions coordinator. Per primary nurse, patient has potential for discharge home today pending results of today's stress test.  No recommendations yet from PT/OT for home recommendations.       1134: care manager rounded, patient off unit for testing      Care Management Interventions  Transition of Care Consult (CM Consult): Discharge Planning  Support Systems: Parent(s),Other Family Member(s)  Confirm Follow Up Transport: Family  Discharge Location  Patient Expects to be Discharged to[de-identified]  (discharge home with physician follow up versus home with [place] versus rehab)

## 2022-04-14 NOTE — PROGRESS NOTES
Hospitalist Progress Note    Patient: Javier Moreno MRN: [id number]  CSN: [id number]    YOB: 1970  Age: 46 y.o. Sex: male    DOA: 4/11/2022 LOS:  LOS: 3 days                Assessment/Plan     Patient Active Problem List   Diagnosis Code    Acute respiratory failure with hypoxemia ([facility] [address] J96.01    Hypercapnia R06.89    Drug overdose T50.901A    Cocaine use F14.90        Chief complaint :  Drug overdose  46 y.o. male who presents to the ED via EMS for Drug overdose OD;  Per EMS pt found with snoring respiration and unresponsive. Admitted to the ICU for acute hypoxemic respiratory failure due to drug overdose, drug overdose, cocaine use, NSTEMI. CRITICAL CARE PLAN    Resp -   Acute resp failure secondary drug overdose and not able to maintain airway. Extubated 04/12/2022. ID -   No evidence of infection    [facility] - Monitor HD. Off levophed. Elevated troponin secondary to substance abuse and resp failure  No ACS per cardiology  Echo with EF of 50-55%  Stress test today. Heme/onc - Follow H&H, plts. Renal - Trend BUN, Cr, follow I/O, gandhi in place. Check and replace Mg, K, phos. Endocrine -  Follow FSG    Neuro/ Pain/ Sedation -  Precedex, versed. GI - full liquid. Prophylaxis - DVT: heparin, GI: protonix          Disposition : 1-2 days    Review of systems  General: No fevers or chills. Cardiovascular:  No palpitations. Pulmonary: No shortness of breath. Gastrointestinal: No nausea, vomiting. Physical Exam:  General: As above    HEENT: NC, Atraumatic. PERRLA, anicteric sclerae. Lungs: CTA Bilaterally. No Wheezing/Rhonchi/Rales.   Heart:  S1 S2,  No murmur, No Rubs, No Gallops  Abdomen: Soft, Non distended, Non tender.  +Bowel sounds,   Extremities: No c/c/e           Vital signs/Intake and Output:  Visit Vitals  /85   Pulse 77   Temp 98.7 °F (37.1 °C)   Resp 18   Ht 6' (1.829 m)   Wt 67.6 kg (149 lb)   SpO2 100%   BMI 20.21 kg/m²     Current Shift:  No intake/output data recorded. Last three shifts:  04/12 1901 - 04/14 0700  In: 1253.8 [I.V.:1253.8]  Out: 2600 [Urine:2600]            Labs: Results:       Chemistry Recent Labs     04/14/22 0154 04/13/22 0224 04/12/22  0450   GLU 87 134* 77    139 142   K 3.8 4.1 4.1    103 108   CO2 30 27 31   BUN 5* 12 14   CREA 0.65 0.96 0.87   CA 8.8 9.0 8.8   AGAP 6 9 3   BUCR 8* 13 16   AP 76 88 82   TP 6.6 6.8 6.5   ALB 2.8* 2.9* 2.6*   GLOB 3.8 3.9 3.9   AGRAT 0.7* 0.7* 0.7*      CBC w/Diff Recent Labs     04/14/22 0154 04/13/22 0224 04/12/22  0450   WBC 9.9 20.2* 13.2   RBC 4.46 4.65 4.41   HGB 13.2 13.8 13.0   HCT 40.0 43.4 40.3    355 364   GRANS 69 81* 78*   LYMPH 18* 7* 13*   EOS 0 0 0      Cardiac Enzymes No results for input(s): CPK, CKND1, CARMITA in the last 72 hours. No lab exists for component: CKRMB, TROIP   Coagulation No results for input(s): PTP, INR, APTT, INREXT, INREXT in the last 72 hours. Lipid Panel No results found for: CHOL, CHOLPOCT, CHOLX, CHLST, CHOLV, 011557, HDL, HDLP, LDL, LDLC, DLDLP, 787233, VLDLC, VLDL, TGLX, TRIGL, TRIGP, TGLPOCT, CHHD, CHHDX   BNP No results for input(s): BNPP in the last 72 hours.    Liver Enzymes Recent Labs     04/14/22 0154   TP 6.6   ALB 2.8*   AP 76      Thyroid Studies No results found for: T4, T3U, TSH, TSHEXT, TSHEXT     Procedures/imaging: see electronic medical records for all procedures/Xrays and details which were not copied into this note but were reviewed prior to creation of Plan

## 2022-04-14 NOTE — PROGRESS NOTES
Occupational Therapy Evaluation Attempt     OT eval orders received. Chart reviewed. Attempted Occupational Therapy Evaluation, however, patient unable to be seen due to:  []  Nausea/vomiting  []  Eating  []  Pain  []  Patient too lethargic  [x]  Off Unit for testing/procedure  []  Dialysis treatment in progress  []  Telemetry Results  []  Other:      Will f/u later as patient's schedule allows.   Shannan Levin, OTR/L

## 2022-04-14 NOTE — PROGRESS NOTES
Cardiology Progress Note        Patient: Elizabeth Schafer        Sex: male          DOA: 4/11/2022  YOB: 1970      Age:  46 y.o.        LOS:  LOS: 3 days   Assessment/Plan     Principal Problem:    Acute respiratory failure with hypoxemia ([address] (4/11/2022)    Active Problems:    Hypercapnia (4/11/2022)      Drug overdose (4/11/2022)      Cocaine use (4/11/2022)        Plan:    4/14/2022  Stress test was negative for ischemia  Musculoskeletal chest pain and reproducible probably due to CPR  Discussed with the family and patient  Discussed with Dr. Silvio Berrios  No further cardiac testing is suggested  Counseled the patient for aggressive risk factor management. Discussed with patient and family  I will schedule stress test tomorrow    Patient is extubated  Elevated troponin without ACS  Substance abuse  Consider stress test before discharging  Discussed with patient and family                    Subjective:    cc:  Elevated troponin        REVIEW OF SYSTEMS:     General: No fevers or chills. Cardiovascular: No chest pain or pressure. No palpitations. No ankle swelling  Pulmonary: No SOB, orthopnea, PND  Gastrointestinal: No nausea, vomiting or diarrhea      Objective:      Visit Vitals  /85   Pulse 77   Temp 98.7 °F (37.1 °C)   Resp 18   Ht 6' (1.829 m)   Wt 67.6 kg (149 lb)   SpO2 100%   BMI 20.21 kg/m²     Body mass index is 20.21 kg/m². Physical Exam:  General Appearance: Comfortable, not using accessory muscles of respiration. NECK: No JVD, no thyroidomeglay. LUNGS: Clear bilaterally. HEART: S1+S2 audible,    ABD: Non-tender, BS Audible    EXT: No edema, and no cysnosis. VASCULAR EXAM: Pulses are intact. PSYCHIATRIC EXAM: Mood is appropriate.     Medication:  Current Facility-Administered Medications   Medication Dose Route Frequency    piperacillin-tazobactam (ZOSYN) 3.375 g in 0.9% sodium chloride (MBP/ADV) 100 mL MBP  3.375 g IntraVENous Q6H    amLODIPine (NORVASC) tablet 5 mg  5 mg Oral DAILY    sodium chloride (NS) flush 5-40 mL  5-40 mL IntraVENous Q8H    sodium chloride (NS) flush 5-40 mL  5-40 mL IntraVENous PRN    acetaminophen (TYLENOL) tablet 650 mg  650 mg Oral Q6H PRN    Or    acetaminophen (TYLENOL) suppository 650 mg  650 mg Rectal Q6H PRN    polyethylene glycol (MIRALAX) packet 17 g  17 g Oral DAILY PRN    potassium chloride 10 mEq in 100 ml IVPB  10 mEq IntraVENous PRN    magnesium sulfate 2 g/50 ml IVPB (premix or compounded)  2 g IntraVENous PRN    pantoprazole (PROTONIX) 40 mg in 0.9% sodium chloride 10 mL injection  40 mg IntraVENous DAILY    heparin (porcine) injection 5,000 Units  5,000 Units SubCUTAneous Q8H               Lab/Data Reviewed:  Procedures/imaging: see electronic medical records for all procedures/Xrays   and details which were not copied into this note but were reviewed prior to creation of Plan       All lab results for the last 24 hours reviewed.      Recent Labs     04/14/22  0154 04/13/22 0224 04/12/22  0450   WBC 9.9 20.2* 13.2   HGB 13.2 13.8 13.0   HCT 40.0 43.4 40.3    355 364     Recent Labs     04/14/22  0154 04/13/22 0224 04/12/22  0450    139 142   K 3.8 4.1 4.1    103 108   CO2 30 27 31   GLU 87 134* 77   BUN 5* 12 14   CREA 0.65 0.96 0.87   CA 8.8 9.0 8.8       RADIOLOGY:  CT Results  (Last 48 hours)    None        CXR Results  (Last 48 hours)    None            Cardiology Procedures:   Results for orders placed or performed during the hospital encounter of 04/11/22   EKG, 12 LEAD, INITIAL   Result Value Ref Range    Ventricular Rate 84 BPM    Atrial Rate 84 BPM    P-R Interval 144 ms    QRS Duration 78 ms    Q-T Interval 418 ms    QTC Calculation (Bezet) 493 ms    Calculated P Axis 75 degrees    Calculated R Axis 38 degrees    Calculated T Axis 64 degrees    Diagnosis       Normal sinus rhythm  Prolonged QT  Nonspecific ST abnormality  Abnormal ECG  No previous ECGs available  Confirmed by Cole Motley MD. ([id number]) on 4/14/2022 7:14:02 AM        Echo Results  (Last 48 hours)    None       Cardiolite (Tc-99m Sestamibi) stress test    Signed By: Chasidy Skinner MD     April 14, 2022

## 2022-04-15 VITALS
HEIGHT: 72 IN | WEIGHT: 149 LBS | BODY MASS INDEX: 20.18 KG/M2 | TEMPERATURE: 98 F | RESPIRATION RATE: 18 BRPM | OXYGEN SATURATION: 100 % | HEART RATE: 80 BPM | DIASTOLIC BLOOD PRESSURE: 81 MMHG | SYSTOLIC BLOOD PRESSURE: 137 MMHG

## 2022-04-15 PROBLEM — I10 HTN (HYPERTENSION): Status: ACTIVE | Noted: 2022-04-15

## 2022-04-15 PROBLEM — J96.01 ACUTE RESPIRATORY FAILURE WITH HYPOXEMIA (HCC): Status: RESOLVED | Noted: 2022-04-11 | Resolved: 2022-04-15

## 2022-04-15 LAB
ALBUMIN SERPL-MCNC: 2.6 G/DL (ref 3.4–5)
ALBUMIN/GLOB SERPL: 0.6 {RATIO} (ref 0.8–1.7)
ALP SERPL-CCNC: 67 U/L (ref 45–117)
ALT SERPL-CCNC: 36 U/L (ref 16–61)
ANION GAP SERPL CALC-SCNC: 3 MMOL/L (ref 3–18)
AST SERPL-CCNC: 50 U/L (ref 10–38)
BASOPHILS # BLD: 0 K/UL (ref 0–0.1)
BASOPHILS NFR BLD: 0 % (ref 0–2)
BILIRUB DIRECT SERPL-MCNC: 0.1 MG/DL (ref 0–0.2)
BILIRUB SERPL-MCNC: 0.4 MG/DL (ref 0.2–1)
BUN SERPL-MCNC: 7 MG/DL (ref 7–18)
BUN/CREAT SERPL: 8 (ref 12–20)
CA-I SERPL-SCNC: 1.19 MMOL/L (ref 1.12–1.32)
CALCIUM SERPL-MCNC: 8.6 MG/DL (ref 8.5–10.1)
CHLORIDE SERPL-SCNC: 106 MMOL/L (ref 100–111)
CO2 SERPL-SCNC: 30 MMOL/L (ref 21–32)
CREAT SERPL-MCNC: 0.88 MG/DL (ref 0.6–1.3)
DIFFERENTIAL METHOD BLD: ABNORMAL
EOSINOPHIL # BLD: 0.1 K/UL (ref 0–0.4)
EOSINOPHIL NFR BLD: 1 % (ref 0–5)
ERYTHROCYTE [DISTWIDTH] IN BLOOD BY AUTOMATED COUNT: 12.3 % (ref 11.6–14.5)
GLOBULIN SER CALC-MCNC: 4.2 G/DL (ref 2–4)
GLUCOSE SERPL-MCNC: 107 MG/DL (ref 74–99)
HCT VFR BLD AUTO: 39.5 % (ref 36–48)
HGB BLD-MCNC: 12.7 G/DL (ref 13–16)
IMM GRANULOCYTES # BLD AUTO: 0.1 K/UL (ref 0–0.04)
IMM GRANULOCYTES NFR BLD AUTO: 1 % (ref 0–0.5)
LYMPHOCYTES # BLD: 2.2 K/UL (ref 0.9–3.6)
LYMPHOCYTES NFR BLD: 20 % (ref 21–52)
MAGNESIUM SERPL-MCNC: 2 MG/DL (ref 1.6–2.6)
MCH RBC QN AUTO: 29 PG (ref 24–34)
MCHC RBC AUTO-ENTMCNC: 32.2 G/DL (ref 31–37)
MCV RBC AUTO: 90.2 FL (ref 78–100)
MONOCYTES # BLD: 1.1 K/UL (ref 0.05–1.2)
MONOCYTES NFR BLD: 10 % (ref 3–10)
NEUTS SEG # BLD: 7.5 K/UL (ref 1.8–8)
NEUTS SEG NFR BLD: 69 % (ref 40–73)
NRBC # BLD: 0 K/UL (ref 0–0.01)
NRBC BLD-RTO: 0 PER 100 WBC
PHOSPHATE SERPL-MCNC: 2.9 MG/DL (ref 2.5–4.9)
PLATELET # BLD AUTO: 407 K/UL (ref 135–420)
PMV BLD AUTO: 8.9 FL (ref 9.2–11.8)
POTASSIUM SERPL-SCNC: 3.7 MMOL/L (ref 3.5–5.5)
PROT SERPL-MCNC: 6.8 G/DL (ref 6.4–8.2)
RBC # BLD AUTO: 4.38 M/UL (ref 4.35–5.65)
SODIUM SERPL-SCNC: 139 MMOL/L (ref 136–145)
WBC # BLD AUTO: 10.9 K/UL (ref 4.6–13.2)

## 2022-04-15 PROCEDURE — 74011250637 HC RX REV CODE- 250/637: Performed by: INTERNAL MEDICINE

## 2022-04-15 PROCEDURE — 82330 ASSAY OF CALCIUM: CPT

## 2022-04-15 PROCEDURE — 80048 BASIC METABOLIC PNL TOTAL CA: CPT

## 2022-04-15 PROCEDURE — 80076 HEPATIC FUNCTION PANEL: CPT

## 2022-04-15 PROCEDURE — 74011000250 HC RX REV CODE- 250: Performed by: FAMILY MEDICINE

## 2022-04-15 PROCEDURE — 84100 ASSAY OF PHOSPHORUS: CPT

## 2022-04-15 PROCEDURE — 74011250636 HC RX REV CODE- 250/636: Performed by: INTERNAL MEDICINE

## 2022-04-15 PROCEDURE — 36415 COLL VENOUS BLD VENIPUNCTURE: CPT

## 2022-04-15 PROCEDURE — 74011250636 HC RX REV CODE- 250/636: Performed by: FAMILY MEDICINE

## 2022-04-15 PROCEDURE — 74011250637 HC RX REV CODE- 250/637: Performed by: FAMILY MEDICINE

## 2022-04-15 PROCEDURE — 74011000258 HC RX REV CODE- 258: Performed by: INTERNAL MEDICINE

## 2022-04-15 PROCEDURE — C9113 INJ PANTOPRAZOLE SODIUM, VIA: HCPCS | Performed by: FAMILY MEDICINE

## 2022-04-15 PROCEDURE — 83735 ASSAY OF MAGNESIUM: CPT

## 2022-04-15 PROCEDURE — 85025 COMPLETE CBC W/AUTO DIFF WBC: CPT

## 2022-04-15 RX ORDER — AMLODIPINE BESYLATE 5 MG/1
5 TABLET ORAL DAILY
Qty: 30 TABLET | Refills: 0 | Status: SHIPPED | OUTPATIENT
Start: 2022-04-16

## 2022-04-15 RX ADMIN — HEPARIN SODIUM 5000 UNITS: 5000 INJECTION INTRAVENOUS; SUBCUTANEOUS at 10:00

## 2022-04-15 RX ADMIN — PIPERACILLIN AND TAZOBACTAM 3.38 G: 3; .375 INJECTION, POWDER, LYOPHILIZED, FOR SOLUTION INTRAVENOUS at 08:11

## 2022-04-15 RX ADMIN — GUAIFENESIN 200 MG: 200 SOLUTION ORAL at 00:21

## 2022-04-15 RX ADMIN — SODIUM CHLORIDE, PRESERVATIVE FREE 10 ML: 5 INJECTION INTRAVENOUS at 06:35

## 2022-04-15 RX ADMIN — HEPARIN SODIUM 5000 UNITS: 5000 INJECTION INTRAVENOUS; SUBCUTANEOUS at 01:48

## 2022-04-15 RX ADMIN — SODIUM CHLORIDE, PRESERVATIVE FREE 40 MG: 5 INJECTION INTRAVENOUS at 08:10

## 2022-04-15 RX ADMIN — PIPERACILLIN AND TAZOBACTAM 3.38 G: 3; .375 INJECTION, POWDER, LYOPHILIZED, FOR SOLUTION INTRAVENOUS at 02:53

## 2022-04-15 RX ADMIN — AMLODIPINE BESYLATE 5 MG: 5 TABLET ORAL at 08:10

## 2022-04-15 NOTE — PROGRESS NOTES
Problem: Falls - Risk of  Goal: *Absence of Falls  Description: Document Morene Hole Fall Risk and appropriate interventions in the flowsheet.   Outcome: Progressing Towards Goal  Note: Fall Risk Interventions:  Mobility Interventions: Patient to call before getting OOB    Mentation Interventions: Evaluate medications/consider consulting pharmacy    Medication Interventions: Evaluate medications/consider consulting pharmacy    Elimination Interventions: Call light in reach    History of Falls Interventions: Evaluate medications/consider consulting pharmacy

## 2022-04-15 NOTE — PROGRESS NOTES
DC Plan: home with family assistance    Patient has had [facility] info placed in AVS for follow up rehab info; plan will be to discharge home with family.     Care Management Interventions  Mode of Transport at Discharge: Self  Transition of Care Consult (CM Consult): Discharge Planning  Support Systems: Other Family Member(s),Parent(s)  Confirm Follow Up Transport: Family  The Plan for Transition of Care is Related to the Following Treatment Goals : NSTEMI, drug OD  The Patient and/or Patient Representative was Provided with a Choice of Provider and Agrees with the Discharge Plan?: Yes  Name of the Patient Representative Who was Provided with a Choice of Provider and Agrees with the Discharge Plan: Floyd First, patient  Discharge Location  Patient Expects to be Discharged to[de-identified] Home with family assistance

## 2022-04-15 NOTE — DISCHARGE SUMMARY
Discharge Summary    Patient: Eugenia Irby MRN: [id number]  CSN: [id number]    YOB: 1970  Age: 46 y.o. Sex: male    DOA: 4/11/2022 LOS:  LOS: 4 days   Discharge Date: 4/15/2022     Primary Care Provider:  None    Admission Diagnoses: NSTEMI (non-ST elevated myocardial infarction) [facility]) [I21.4]    Discharge Diagnoses:    Problem List as of 4/15/2022 Never Reviewed          Codes Class Noted - Resolved    HTN (hypertension) ICD-10-CM: I10  ICD-9-CM: 401.9  4/15/2022 - Present        Hypercapnia ICD-10-CM: R06.89  ICD-9-CM: 786.09  4/11/2022 - Present        Drug overdose ICD-10-CM: T50.901A  ICD-9-CM: 977.9, E980.5  4/11/2022 - Present        Cocaine use ICD-10-CM: F14.90  ICD-9-CM: 305.60  4/11/2022 - Present        * (Principal) RESOLVED: Acute respiratory failure with hypoxemia ([facility] [address] ICD-10-CM: J96.01  ICD-9-CM: 518.81  4/11/2022 - 4/15/2022              Discharge Medications:     Discharge Medication List as of 4/15/2022 12:29 PM          Discharge Condition: Good    Procedures : None    Consults: Cardiology and Pulmonary/Critical Care      PHYSICAL EXAM   Visit Vitals  /81 (BP 1 Location: Left upper arm, BP Patient Position: Sitting)   Pulse 80   Temp 98 °F (36.7 °C)   Resp 18   Ht 6' (1.829 m)   Wt 67.6 kg (149 lb)   SpO2 100%   BMI 20.21 kg/m²     General: Awake, cooperative, no acute distress    HEENT: NC, Atraumatic. PERRLA, EOMI. Anicteric sclerae. Lungs:  CTA Bilaterally. No Wheezing/Rhonchi/Rales. Heart:  Regular  rhythm,  No murmur, No Rubs, No Gallops  Abdomen: Soft, Non distended, Non tender. +Bowel sounds,   Extremities: No c/c/e  Psych:   Not anxious or agitated. Neurologic:  No acute neurological deficits.                                      Admission HPI :   Eugenia Irby is a 46 y.o. male who presents to the ED via EMS for Drug overdose OD;  Per EMS pt found with snoring respiration and unresponsive;  Bystander CPR was being performed when EMS arrived pt had strong pulse and stopped CPR; Some question of loss of pulse and police on scene did CPR, but pt still with unstable respirations and unresponsive. Patient was assisted with breathing via bag-valve-mask. EMS gave Narcan 2 mg IV and without much change in status. Arrival to ED patient was placed on nonrebreather mask at 10 L. Patient after arrival to the ED became combative attempting to hit staff and get out of bed. Redirectable efforts were done without success. Patient had to be placed in four-point restraints. Patient received Narcan in the ED ended up requiring 5 mg of Narcan. Patient intermittently awake but is lethargic and goes back to being unresponsive. Concerned about patient's airway remained. ABG obtained showed a pH of 7.22, CO2 of 57.5 and a PO2 of 207 and bicarb of 23.6 on nonrebreather. he continued to have trouble maintaining consciousness and repeat ABG was obtained which showed worsening pH dropped to 7.19, PCO2 increased to 73.4 and PO2 dropped to 66 ED physician decided to intubate patient. Hospital Course :   Mr. Tuyet Lagunas was admitted initially to ICU. He was seen and followed by pulmonary critical care, cardiology. Once he met parameters he was successfully extubated. His troponin trended down. His blood pressure little bit on the higher side he was started on amlodipine. Once extubated he was seen by SLP and started on diet per SLP. He is not tolerating regular diet. Cardiology recommended stress test before discharge. His nuclear stress test negative for any ischemic findings. He is cleared from cardiology and pulmonary standpoint to be discharged. Patient counseled multiple times on substance abuse.     Activity: Activity as tolerated    Diet: Regular Diet    Follow-up: PCP    Disposition: Home    Minutes spent on discharge: 45      Labs: Results:       Chemistry Recent Labs     04/15/22  0346 04/14/22  0154 04/13/22  0224   * 87 134*    141 139   K 3.7 3.8 4.1    105 103   CO2 30 30 27   BUN 7 5* 12   CREA 0.88 0.65 0.96   CA 8.6 8.8 9.0   AGAP 3 6 9   BUCR 8* 8* 13   AP 67 76 88   TP 6.8 6.6 6.8   ALB 2.6* 2.8* 2.9*   GLOB 4.2* 3.8 3.9   AGRAT 0.6* 0.7* 0.7*      CBC w/Diff Recent Labs     04/15/22  0346 04/14/22  0154 04/13/22  0224   WBC 10.9 9.9 20.2*   RBC 4.38 4.46 4.65   HGB 12.7* 13.2 13.8   HCT 39.5 40.0 43.4    397 355   GRANS 69 69 81*   LYMPH 20* 18* 7*   EOS 1 0 0      Cardiac Enzymes No results for input(s): CPK, CKND1, CARMITA in the last 72 hours. No lab exists for component: CKRMB, TROIP   Coagulation No results for input(s): PTP, INR, APTT, INREXT, INREXT in the last 72 hours. Lipid Panel No results found for: CHOL, CHOLPOCT, CHOLX, CHLST, CHOLV, 610217, HDL, HDLP, LDL, LDLC, DLDLP, 210767, VLDLC, VLDL, TGLX, TRIGL, TRIGP, TGLPOCT, CHHD, CHHDX   BNP No results for input(s): BNPP in the last 72 hours. Liver Enzymes Recent Labs     04/15/22  0346   TP 6.8   ALB 2.6*   AP 67      Thyroid Studies No results found for: T4, T3U, TSH, TSHEXT, TSHEXT         Significant Diagnostic Studies: CT HEAD WO CONT    Result Date: 4/11/2022  EXAM: CT head without contrast 4/11/2022 CLINICAL INDICATION/HISTORY:  Altered mental status. COMPARISON: None. TECHNIQUE: Serial axial images of the head were performed without intravenous contrast. Sagittal and coronal reformations were obtained from source images. Automated exposure control, mAs and/or kVp reductions based on patient size, and iterative reconstruction. The specific techniques utilized on this CT exam have been documented in the patient's electronic medical record. Digital imaging and communications and medicine (DICOM) format image data are available to nonaffiliated external healthcare facilities or entities on a secure, media free, reciprocally searchable basis with patient authorization for at least a 12 month period after this study.  _______________ FINDINGS: BRAIN: The sulci, folia, ventricles and basal cisterns are within normal limits for the patient's age. There is no intracranial hemorrhage, mass effect, or midline shift. There are no areas of abnormal parenchymal attenuation. EXTRA-AXIAL SPACES AND MENINGES: There are no abnormal extra-axial fluid collections. CALVARIUM: Intact. OTHER: The visualized portions of the paranasal sinuses and mastoid air cells are clear. _______________     Unremarkable noncontrast head CT. XR CHEST PORT    Result Date: 4/12/2022  EXAM: XR CHEST PORT CLINICAL INDICATION/HISTORY: Acute hypercapnic respiratory failure, ET tube position -Additional: None COMPARISON: One day prior TECHNIQUE: Portable frontal view of the chest _______________ FINDINGS: SUPPORT DEVICES: Endotracheal and enteric tubes unchanged. HEART AND MEDIASTINUM: Cardiomediastinal silhouette within normal limits. LUNGS AND PLEURAL SPACES: Bilateral interstitial opacities, predominantly in the right upper lung. No large effusion or pneumothorax. _______________     Bilateral interstitial opacities, predominantly in the right upper lung. XR CHEST PORT    Result Date: 4/11/2022  EXAM: XR CHEST PORT CLINICAL INDICATION/HISTORY: intubation -Additional: None COMPARISON: Earlier the same day TECHNIQUE: Portable frontal view of the chest _______________ FINDINGS: SUPPORT DEVICES: Interval intubation and enteric tube placement. HEART AND MEDIASTINUM: Cardiomediastinal silhouette within normal limits. LUNGS AND PLEURAL SPACES: No dense consolidation, large effusion or pneumothorax. _______________     No acute cardiopulmonary abnormality. Interval intubation and enteric tube placement. XR CHEST PORT    Result Date: 4/11/2022  CLINICAL HISTORY: Found unresponsive. COMPARISONS:  None. TECHNIQUE:  single frontal view of the chest ------------------------------------------ FINDINGS: Lungs:  No consolidation or pleural fluid. Mild increase in pulmonary interstitial markings.  Mediastinum: Unremarkable. Bones: No evidence of fracture or suspicious bone lesion. ------------------------------------------    Mild increase in pulmonary interstitial markings, potentially chronic, though mild interstitial edema not excluded. ECHO ADULT COMPLETE    Result Date: 4/11/2022    Left Ventricle: Left ventricle is dilated. Mildly increased wall thickness. Global hypokinesis present. Low normal left ventricular systolic function with a visually estimated EF of 50 - 55%. Grade I diastolic dysfunction.   Right Ventricle: Mildly reduced systolic function.   Aortic Valve: Tricuspid valve. Mildly thickened cusp. Mild annular calcification. NUCLEAR CARDIAC STRESS TEST    Result Date: 4/14/2022    Nuclear Findings: LVEF measures 52%. The defect appears to be caused by soft tissue.   Nuclear Findings: LVEF measures 52%. LV perfusion is probably normal.   Nuclear Findings: The study is negative for myocardial ischemia. Findings suggest a low risk of myocardial ischemia.   ECG: Resting ECG demonstrates normal sinus rhythm.   ECG: Stress ECG was not diagnostic due to resting ST-T abnormalities.   Stress Test: A pharmacological stress test was performed using lexiscan. This is a probably negative pharmacological Lexiscan nuclear stress test for detection of ischemia. Resting EKG sinus rhythm with ST and T changes. Non diagnostic EKG changes with pharmacological stress test. No significant arrhythmias during stress test. Nuclear images are compromised with motion artifact, diaphragmatic attenuation artifact and high uptake of radioisotope and intestine close to the inferior wall. No significant ischemia or infarction non attenuation corrected images. No results found for this or any previous visit. Please note that this dictation was completed with [employer], the [employer] voice recognition software.   Quite often unanticipated grammatical, syntax, homophones, and other interpretive errors are inadvertently transcribed by the computer software. Please disregard these errors. Please excuse any errors that have escaped final proofreading.      CC: None

## 2022-04-15 NOTE — ROUTINE PROCESS
Patient was very upset and tearfull from receiving news of his brother's passing. Patient noted to be very forgetful for informed nurse multiple times about his brother's passing.  service support was offered to patient but declined. Girlfriend at bedside. C/o discomfort with continuous cough. MD paged. Coughed meds ordered and given. Effective.